# Patient Record
Sex: MALE | Race: WHITE | NOT HISPANIC OR LATINO | Employment: OTHER | URBAN - NONMETROPOLITAN AREA
[De-identification: names, ages, dates, MRNs, and addresses within clinical notes are randomized per-mention and may not be internally consistent; named-entity substitution may affect disease eponyms.]

---

## 2018-07-15 ENCOUNTER — HISTORICAL (OUTPATIENT)
Dept: ADMINISTRATIVE | Facility: HOSPITAL | Age: 63
End: 2018-07-15

## 2022-06-01 ENCOUNTER — ANESTHESIA EVENT (OUTPATIENT)
Dept: SURGERY | Facility: HOSPITAL | Age: 67
End: 2022-06-01
Payer: MEDICARE

## 2022-06-01 ENCOUNTER — HOSPITAL ENCOUNTER (OUTPATIENT)
Dept: PREADMISSION TESTING | Facility: HOSPITAL | Age: 67
Discharge: HOME | End: 2022-06-01
Attending: SURGERY
Payer: MEDICARE

## 2022-06-01 ENCOUNTER — LAB VISIT (OUTPATIENT)
Dept: LAB | Facility: HOSPITAL | Age: 67
End: 2022-06-01
Attending: SURGERY
Payer: MEDICARE

## 2022-06-01 DIAGNOSIS — K40.90 RIGHT INGUINAL HERNIA: Primary | ICD-10-CM

## 2022-06-01 LAB
AMORPH URATE CRY URNS QL MICRO: ABNORMAL /HPF
APPEARANCE UR: ABNORMAL
BACTERIA #/AREA URNS AUTO: ABNORMAL /HPF
BILIRUB UR QL STRIP.AUTO: NEGATIVE MG/DL
COLOR UR AUTO: YELLOW
GLUCOSE UR QL STRIP.AUTO: NEGATIVE MG/DL
KETONES UR QL STRIP.AUTO: NEGATIVE MG/DL
LEUKOCYTE ESTERASE UR QL STRIP.AUTO: NEGATIVE UNIT/L
NITRITE UR QL STRIP.AUTO: NEGATIVE
PH UR STRIP.AUTO: 6.5 [PH]
PROT UR QL STRIP.AUTO: NEGATIVE MG/DL
RBC #/AREA URNS AUTO: ABNORMAL /HPF
RBC UR QL AUTO: NEGATIVE UNIT/L
SARS-COV-2 RDRP RESP QL NAA+PROBE: NEGATIVE
SP GR UR STRIP.AUTO: 1.02
SQUAMOUS #/AREA URNS AUTO: ABNORMAL /LPF
UROBILINOGEN UR STRIP-ACNC: 0.2 MG/DL
WBC #/AREA URNS AUTO: ABNORMAL /HPF

## 2022-06-01 PROCEDURE — 81001 URINALYSIS AUTO W/SCOPE: CPT

## 2022-06-01 PROCEDURE — 81003 URINALYSIS AUTO W/O SCOPE: CPT

## 2022-06-01 PROCEDURE — 87635 SARS-COV-2 COVID-19 AMP PRB: CPT

## 2022-06-01 RX ORDER — TRAMADOL HYDROCHLORIDE 50 MG/1
50 TABLET ORAL
COMMUNITY
End: 2023-01-24

## 2022-06-01 RX ORDER — MELOXICAM 7.5 MG/1
7.5-15 TABLET ORAL DAILY PRN
COMMUNITY
Start: 2022-04-12 | End: 2023-01-11

## 2022-06-01 NOTE — PRE-PROCEDURE INSTRUCTIONS
All instructions given to patient regarding operative procedure. All questions answered. Will call 6/2/22 with arrival time for 6/3

## 2022-06-01 NOTE — DISCHARGE INSTRUCTIONS
No driving today    Do not get dressing wet    Remove dressing on Sunday 6/5/22    Shower only after you remove dressing - no baths    Leave steri-strips in place until your follow up appointment next week with Dr. Tolliver

## 2022-06-03 ENCOUNTER — HOSPITAL ENCOUNTER (OUTPATIENT)
Facility: HOSPITAL | Age: 67
Discharge: HOME | End: 2022-06-03
Attending: SURGERY | Admitting: SURGERY
Payer: MEDICARE

## 2022-06-03 ENCOUNTER — ANESTHESIA (OUTPATIENT)
Dept: SURGERY | Facility: HOSPITAL | Age: 67
End: 2022-06-03
Payer: MEDICARE

## 2022-06-03 DIAGNOSIS — K40.90 INGUINAL HERNIA, RIGHT: Primary | ICD-10-CM

## 2022-06-03 DIAGNOSIS — Z01.818 PRE-OP TESTING: ICD-10-CM

## 2022-06-03 LAB
ALBUMIN SERPL-MCNC: 4 GM/DL (ref 3.4–4.8)
ALBUMIN/GLOB SERPL: 1.4 RATIO (ref 1.1–2)
ALP SERPL-CCNC: 73 UNIT/L (ref 40–150)
ALT SERPL-CCNC: 19 UNIT/L (ref 0–55)
AST SERPL-CCNC: 16 UNIT/L (ref 5–34)
BASOPHILS # BLD AUTO: 0.04 X10(3)/MCL (ref 0–0.2)
BASOPHILS NFR BLD AUTO: 0.4 %
BILIRUBIN DIRECT+TOT PNL SERPL-MCNC: 0.4 MG/DL
BUN SERPL-MCNC: 13 MG/DL (ref 8.4–25.7)
CALCIUM SERPL-MCNC: 9.4 MG/DL (ref 8.8–10)
CHLORIDE SERPL-SCNC: 106 MMOL/L (ref 98–107)
CO2 SERPL-SCNC: 28 MMOL/L (ref 23–31)
CREAT SERPL-MCNC: 0.83 MG/DL (ref 0.73–1.18)
EOSINOPHIL # BLD AUTO: 0.81 X10(3)/MCL (ref 0–0.9)
EOSINOPHIL NFR BLD AUTO: 8.5 %
ERYTHROCYTE [DISTWIDTH] IN BLOOD BY AUTOMATED COUNT: 12.7 % (ref 11.5–17)
GLOBULIN SER-MCNC: 2.9 GM/DL (ref 2.4–3.5)
GLUCOSE SERPL-MCNC: 107 MG/DL (ref 82–115)
HCT VFR BLD AUTO: 46.5 % (ref 42–52)
HGB BLD-MCNC: 15.4 GM/DL (ref 14–18)
IMM GRANULOCYTES # BLD AUTO: 0.03 X10(3)/MCL (ref 0–0.02)
IMM GRANULOCYTES NFR BLD AUTO: 0.3 % (ref 0–0.43)
LYMPHOCYTES # BLD AUTO: 2.15 X10(3)/MCL (ref 0.6–4.6)
LYMPHOCYTES NFR BLD AUTO: 22.7 %
MCH RBC QN AUTO: 31.7 PG (ref 27–31)
MCHC RBC AUTO-ENTMCNC: 33.1 MG/DL (ref 33–36)
MCV RBC AUTO: 95.7 FL (ref 80–94)
MONOCYTES # BLD AUTO: 0.7 X10(3)/MCL (ref 0.1–1.3)
MONOCYTES NFR BLD AUTO: 7.4 %
NEUTROPHILS # BLD AUTO: 5.8 X10(3)/MCL (ref 2.1–9.2)
NEUTROPHILS NFR BLD AUTO: 60.7 %
NRBC BLD AUTO-RTO: 0 %
PLATELET # BLD AUTO: 210 X10(3)/MCL (ref 130–400)
PMV BLD AUTO: 10.1 FL (ref 9.4–12.4)
POTASSIUM SERPL-SCNC: 4.7 MMOL/L (ref 3.5–5.1)
PROT SERPL-MCNC: 6.9 GM/DL (ref 5.8–7.6)
RBC # BLD AUTO: 4.86 X10(6)/MCL (ref 4.7–6.1)
SODIUM SERPL-SCNC: 143 MMOL/L (ref 136–145)
WBC # SPEC AUTO: 9.5 X10(3)/MCL (ref 4.5–11.5)

## 2022-06-03 PROCEDURE — 71000039 HC RECOVERY, EACH ADD'L HOUR: Performed by: SURGERY

## 2022-06-03 PROCEDURE — 71000016 HC POSTOP RECOV ADDL HR: Performed by: SURGERY

## 2022-06-03 PROCEDURE — C1781 MESH (IMPLANTABLE): HCPCS | Performed by: SURGERY

## 2022-06-03 PROCEDURE — 64486 TAP BLOCK UNIL BY INJECTION: CPT | Mod: 59,RT,, | Performed by: NURSE ANESTHETIST, CERTIFIED REGISTERED

## 2022-06-03 PROCEDURE — 36000707: Performed by: SURGERY

## 2022-06-03 PROCEDURE — 85025 COMPLETE CBC W/AUTO DIFF WBC: CPT | Performed by: NURSE ANESTHETIST, CERTIFIED REGISTERED

## 2022-06-03 PROCEDURE — 93005 ELECTROCARDIOGRAM TRACING: CPT

## 2022-06-03 PROCEDURE — 27201423 OPTIME MED/SURG SUP & DEVICES STERILE SUPPLY: Performed by: SURGERY

## 2022-06-03 PROCEDURE — 36000706: Performed by: SURGERY

## 2022-06-03 PROCEDURE — C1729 CATH, DRAINAGE: HCPCS | Performed by: SURGERY

## 2022-06-03 PROCEDURE — 63600175 PHARM REV CODE 636 W HCPCS: Performed by: NURSE ANESTHETIST, CERTIFIED REGISTERED

## 2022-06-03 PROCEDURE — 80053 COMPREHEN METABOLIC PANEL: CPT | Performed by: NURSE ANESTHETIST, CERTIFIED REGISTERED

## 2022-06-03 PROCEDURE — 00830 ANES HERNIA RPR LWR ABD NOS: CPT | Mod: QZ,P2 | Performed by: NURSE ANESTHETIST, CERTIFIED REGISTERED

## 2022-06-03 PROCEDURE — D9220AH HC ANESTHESIA PROFESSIONAL FEE: Mod: QZ,P2 | Performed by: NURSE ANESTHETIST, CERTIFIED REGISTERED

## 2022-06-03 PROCEDURE — 71000033 HC RECOVERY, INTIAL HOUR: Performed by: SURGERY

## 2022-06-03 PROCEDURE — 36415 COLL VENOUS BLD VENIPUNCTURE: CPT | Performed by: NURSE ANESTHETIST, CERTIFIED REGISTERED

## 2022-06-03 PROCEDURE — 25000003 PHARM REV CODE 250: Performed by: NURSE ANESTHETIST, CERTIFIED REGISTERED

## 2022-06-03 PROCEDURE — 37000008 HC ANESTHESIA 1ST 15 MINUTES: Performed by: SURGERY

## 2022-06-03 PROCEDURE — 64486 PR TAP BLOCK UNILAT;BY INJECTION(S) INCL IMAG GUIDANCE: ICD-10-PCS | Mod: 59,RT,, | Performed by: NURSE ANESTHETIST, CERTIFIED REGISTERED

## 2022-06-03 PROCEDURE — 99900031 HC PATIENT EDUCATION (STAT)

## 2022-06-03 PROCEDURE — 37000009 HC ANESTHESIA EA ADD 15 MINS: Performed by: SURGERY

## 2022-06-03 PROCEDURE — 63600175 PHARM REV CODE 636 W HCPCS: Performed by: SURGERY

## 2022-06-03 PROCEDURE — 25000003 PHARM REV CODE 250: Performed by: SURGERY

## 2022-06-03 PROCEDURE — 71000015 HC POSTOP RECOV 1ST HR: Performed by: SURGERY

## 2022-06-03 PROCEDURE — 25000003 PHARM REV CODE 250

## 2022-06-03 DEVICE — MESH PROGRIP RIGHT: Type: IMPLANTABLE DEVICE | Site: GROIN | Status: FUNCTIONAL

## 2022-06-03 RX ORDER — MORPHINE SULFATE 10 MG/ML
3 INJECTION INTRAMUSCULAR; INTRAVENOUS; SUBCUTANEOUS
Status: DISCONTINUED | OUTPATIENT
Start: 2022-06-03 | End: 2022-06-03 | Stop reason: HOSPADM

## 2022-06-03 RX ORDER — ONDANSETRON 4 MG/1
8 TABLET, ORALLY DISINTEGRATING ORAL EVERY 8 HOURS PRN
Status: DISCONTINUED | OUTPATIENT
Start: 2022-06-03 | End: 2022-06-03 | Stop reason: HOSPADM

## 2022-06-03 RX ORDER — MIDAZOLAM HYDROCHLORIDE 1 MG/ML
INJECTION INTRAMUSCULAR; INTRAVENOUS
Status: DISCONTINUED | OUTPATIENT
Start: 2022-06-03 | End: 2022-06-03

## 2022-06-03 RX ORDER — HYDROCODONE BITARTRATE AND ACETAMINOPHEN 5; 325 MG/1; MG/1
1 TABLET ORAL EVERY 4 HOURS PRN
Status: DISCONTINUED | OUTPATIENT
Start: 2022-06-03 | End: 2022-06-03

## 2022-06-03 RX ORDER — DEXAMETHASONE SODIUM PHOSPHATE 4 MG/ML
INJECTION, SOLUTION INTRA-ARTICULAR; INTRALESIONAL; INTRAMUSCULAR; INTRAVENOUS; SOFT TISSUE
Status: DISCONTINUED | OUTPATIENT
Start: 2022-06-03 | End: 2022-06-03

## 2022-06-03 RX ORDER — HYDROCODONE BITARTRATE AND ACETAMINOPHEN 5; 325 MG/1; MG/1
1 TABLET ORAL EVERY 6 HOURS PRN
Qty: 25 TABLET | Refills: 0 | Status: SHIPPED | OUTPATIENT
Start: 2022-06-03 | End: 2023-09-12

## 2022-06-03 RX ORDER — PROPOFOL 10 MG/ML
VIAL (ML) INTRAVENOUS
Status: DISCONTINUED | OUTPATIENT
Start: 2022-06-03 | End: 2022-06-03

## 2022-06-03 RX ORDER — NEOSTIGMINE METHYLSULFATE 1 MG/ML
INJECTION, SOLUTION INTRAVENOUS
Status: DISCONTINUED | OUTPATIENT
Start: 2022-06-03 | End: 2022-06-03

## 2022-06-03 RX ORDER — BUPIVACAINE HYDROCHLORIDE 5 MG/ML
INJECTION, SOLUTION EPIDURAL; INTRACAUDAL
Status: COMPLETED | OUTPATIENT
Start: 2022-06-03 | End: 2022-06-03

## 2022-06-03 RX ORDER — HYDROMORPHONE HYDROCHLORIDE 1 MG/ML
0.5 INJECTION, SOLUTION INTRAMUSCULAR; INTRAVENOUS; SUBCUTANEOUS EVERY 5 MIN PRN
Status: DISCONTINUED | OUTPATIENT
Start: 2022-06-03 | End: 2022-06-03 | Stop reason: HOSPADM

## 2022-06-03 RX ORDER — LIDOCAINE HYDROCHLORIDE 10 MG/ML
INJECTION, SOLUTION INTRAVENOUS
Status: DISCONTINUED | OUTPATIENT
Start: 2022-06-03 | End: 2022-06-03

## 2022-06-03 RX ORDER — CEFAZOLIN SODIUM 1 G/3ML
2 INJECTION, POWDER, FOR SOLUTION INTRAMUSCULAR; INTRAVENOUS
Status: DISCONTINUED | OUTPATIENT
Start: 2022-06-03 | End: 2022-06-03 | Stop reason: HOSPADM

## 2022-06-03 RX ORDER — ATROPINE SULFATE 0.4 MG/ML
INJECTION, SOLUTION ENDOTRACHEAL; INTRAMEDULLARY; INTRAMUSCULAR; INTRAVENOUS; SUBCUTANEOUS
Status: DISCONTINUED | OUTPATIENT
Start: 2022-06-03 | End: 2022-06-03

## 2022-06-03 RX ORDER — ROCURONIUM BROMIDE 10 MG/ML
INJECTION, SOLUTION INTRAVENOUS
Status: DISCONTINUED | OUTPATIENT
Start: 2022-06-03 | End: 2022-06-03

## 2022-06-03 RX ORDER — SODIUM CHLORIDE, SODIUM LACTATE, POTASSIUM CHLORIDE, CALCIUM CHLORIDE 600; 310; 30; 20 MG/100ML; MG/100ML; MG/100ML; MG/100ML
INJECTION, SOLUTION INTRAVENOUS CONTINUOUS
Status: DISCONTINUED | OUTPATIENT
Start: 2022-06-03 | End: 2022-06-03 | Stop reason: HOSPADM

## 2022-06-03 RX ORDER — DROPERIDOL 2.5 MG/ML
INJECTION, SOLUTION INTRAMUSCULAR; INTRAVENOUS
Status: DISCONTINUED | OUTPATIENT
Start: 2022-06-03 | End: 2022-06-03

## 2022-06-03 RX ORDER — MEPERIDINE HYDROCHLORIDE 25 MG/ML
12.5 INJECTION INTRAMUSCULAR; INTRAVENOUS; SUBCUTANEOUS EVERY 10 MIN PRN
Status: DISCONTINUED | OUTPATIENT
Start: 2022-06-03 | End: 2022-06-03 | Stop reason: HOSPADM

## 2022-06-03 RX ORDER — SODIUM CHLORIDE 9 MG/ML
INJECTION, SOLUTION INTRAVENOUS CONTINUOUS
Status: DISCONTINUED | OUTPATIENT
Start: 2022-06-03 | End: 2022-06-03 | Stop reason: HOSPADM

## 2022-06-03 RX ORDER — FENTANYL CITRATE 50 UG/ML
INJECTION, SOLUTION INTRAMUSCULAR; INTRAVENOUS
Status: DISCONTINUED | OUTPATIENT
Start: 2022-06-03 | End: 2022-06-03

## 2022-06-03 RX ORDER — HYDROCODONE BITARTRATE AND ACETAMINOPHEN 5; 325 MG/1; MG/1
1 TABLET ORAL EVERY 6 HOURS PRN
Status: DISCONTINUED | OUTPATIENT
Start: 2022-06-03 | End: 2022-06-03 | Stop reason: HOSPADM

## 2022-06-03 RX ORDER — LIDOCAINE HYDROCHLORIDE AND EPINEPHRINE 10; 10 MG/ML; UG/ML
INJECTION, SOLUTION INFILTRATION; PERINEURAL
Status: DISCONTINUED | OUTPATIENT
Start: 2022-06-03 | End: 2022-06-03 | Stop reason: HOSPADM

## 2022-06-03 RX ADMIN — NEOSTIGMINE METHYLSULFATE 2.5 MG: 1 INJECTION INTRAVENOUS at 12:06

## 2022-06-03 RX ADMIN — HYDROMORPHONE HYDROCHLORIDE 0.5 MG: 1 INJECTION, SOLUTION INTRAMUSCULAR; INTRAVENOUS; SUBCUTANEOUS at 01:06

## 2022-06-03 RX ADMIN — BUPIVACAINE HYDROCHLORIDE 15 ML: 5 INJECTION, SOLUTION EPIDURAL; INTRACAUDAL; PERINEURAL at 11:06

## 2022-06-03 RX ADMIN — PROPOFOL 150 MG: 10 INJECTION, EMULSION INTRAVENOUS at 11:06

## 2022-06-03 RX ADMIN — SODIUM CHLORIDE, POTASSIUM CHLORIDE, SODIUM LACTATE AND CALCIUM CHLORIDE: 600; 310; 30; 20 INJECTION, SOLUTION INTRAVENOUS at 08:06

## 2022-06-03 RX ADMIN — MIDAZOLAM HYDROCHLORIDE 2 MG: 1 INJECTION, SOLUTION INTRAMUSCULAR; INTRAVENOUS at 11:06

## 2022-06-03 RX ADMIN — FENTANYL CITRATE 100 MCG: 50 INJECTION INTRAMUSCULAR; INTRAVENOUS at 11:06

## 2022-06-03 RX ADMIN — DROPERIDOL 0.62 MG: 2.5 INJECTION, SOLUTION INTRAMUSCULAR; INTRAVENOUS at 11:06

## 2022-06-03 RX ADMIN — ROCURONIUM BROMIDE 20 MG: 10 INJECTION, SOLUTION INTRAVENOUS at 11:06

## 2022-06-03 RX ADMIN — ATROPINE SULFATE 0.4 MG: 0.4 INJECTION, SOLUTION INTRAMUSCULAR; INTRAVENOUS; SUBCUTANEOUS at 12:06

## 2022-06-03 RX ADMIN — LIDOCAINE HYDROCHLORIDE 20 MG: 10 INJECTION, SOLUTION INTRAVENOUS at 11:06

## 2022-06-03 RX ADMIN — CEFAZOLIN 2 G: 1 INJECTION, POWDER, FOR SOLUTION INTRAMUSCULAR; INTRAVENOUS; PARENTERAL at 11:06

## 2022-06-03 NOTE — PLAN OF CARE
Ice pack applied to right inguinal area. Dressing to site continues to remain clean, dry and intact.

## 2022-06-03 NOTE — PLAN OF CARE
Pedro Luis Patel CRNA at pt bedside. Pulse 48. States pt stable enough to send back to post-op room. Notify Dr Uribe of pt bradycardia.

## 2022-06-03 NOTE — ANESTHESIA PROCEDURE NOTES
Peripheral Block    Patient location during procedure: OR   Block not for primary anesthetic.  Reason for block: at surgeon's request and post-op pain management   Post-op Pain Location: right Inguinal   Start time: 6/3/2022 11:35 AM  Timeout: 6/3/2022 11:35 AM   End time: 6/3/2022 11:40 AM    Staffing  Authorizing Provider: Romain Patel CRNA  Performing Provider: Romain Patel CRNA    Preanesthetic Checklist  Completed: patient identified, IV checked, site marked, risks and benefits discussed, surgical consent, monitors and equipment checked, pre-op evaluation and timeout performed  Peripheral Block  Patient position: supine  Prep: ChloraPrep  Patient monitoring: heart rate and continuous pulse ox  Block type: TAP  Laterality: right  Injection technique: single shot  Needle  Needle type: Stimuplex   Needle gauge: 20 G  Needle length: 4 in  Needle localization: ultrasound guidance  Needle insertion depth: 2.5 cm     Assessment  Injection assessment: negative aspiration and local visualized surrounding nerve  Heart rate change: no  Pain Tolerance: comfortable throughout block  Medications:    Medications: bupivacaine (pf) (MARCAINE) injection 0.5% - Perineural   15 mL - 6/3/2022 11:35:00 AM

## 2022-06-03 NOTE — TRANSFER OF CARE
"Anesthesia Transfer of Care Note    Patient: Sotero Medrano    Procedure(s) Performed: Procedure(s) (LRB):  REPAIR, HERNIA, INGUINAL (Right)    Patient location: PACU    Transport from OR: Transported from OR on room air with adequate spontaneous ventilation    Post pain: adequate analgesia    Post assessment: no apparent anesthetic complications    Post vital signs: stable    Level of consciousness: awake    Nausea/Vomiting: no nausea/vomiting    Complications: none    Transfer of care protocol was followed      Last vitals:   Visit Vitals  BP (!) 180/87   Pulse (!) 55   Temp 36.3 °C (97.4 °F) (Oral)   Resp 20   Ht 5' 6" (1.676 m)   Wt 65.3 kg (144 lb)   SpO2 98%   BMI 23.24 kg/m²     "

## 2022-06-03 NOTE — ANESTHESIA PROCEDURE NOTES
Intubation    Date/Time: 6/3/2022 11:24 AM  Performed by: Romain Patel CRNA  Authorized by: Romain Patel CRNA     Intubation:     Induction:  Intravenous    Mask Ventilation:  Easy mask    Attempts:  1    Attempted By:  CRNA    Method of Intubation:  Blind intubation    Laryngeal View Grade: Laryngeal Manipulation      Laryngeal View Grade (2nd Attempt): Laryngeal Manipulation      Laryngeal View Grade (3rd Attempt): Laryngeal Manipulation      Difficult Airway Encountered?: No      Complications:  None    Airway Device:  Supraglottic airway/LMA    Airway Device Size:  5.0    Style/Cuff Inflation:  Cuffed (inflated to minimal occlusive pressure)    Placement Verified By:  Capnometry    Complicating Factors:  None    Findings Post-Intubation:  BS equal bilateral

## 2022-06-03 NOTE — OP NOTE
Date of procedure:  06/03/2022    Indications:  59-year-old white male presenting with complaint of right inguinal enlarging bulge consistent with a right inguinal hernia elected to undergo inguinal hernia repair mesh.    Preoperative diagnosis:  Right inguinal hernia  Postoperative diagnosis:  Indirect right inguinal hernia    Specimen:  Indirect inguinal hernia sac  Estimated blood loss:  5 cc    Procedure performed:  Right inguinal hernia repair mesh with high ligation indirect inguinal hernia sac    Procedure in detail:  Patient brought to the operative theater laid in a supine position and general endotracheal intubation anesthesia was provided.  Preoperative antibiotics administered.  There of the groin was then trimmed of all hair sterilely prepped and draped in normal surgical fashion using chlorhexidine.  1% lidocaine with epinephrine infiltrate the subcutaneous tissues overlying the right inguinal crease.  Fifteen blade was incise the skin with dissection down to the fatty tissues.  Bovie cauterization carried down to the dissection to the external oblique fascia.  Upon identification it was opened lateral to medial opening up the external ring.  Cord structures identified they were encircled with a Penrose drain.  The vas deferens and the testicular vessels were then isolated free from indirect inguinal hernia sac.  Hernia sac was then high ligated with 2-0 Vicryl.  It was transected released back into the abdomen.  A segment of mesh was then encircled around the cord structures suturing it medially to the pubic tubercle run inferiorly along the inguinal ligament and superiorly to the conjoint tendon.  It was encircled around the cord structures recreating the deep inguinal ring with enough separation for the tip of my pinky finger.  Cavity was made hemostatic with Bovie cauterization the external oblique fascia was reapproximated in a lateral to medial direction recreating the external ring.  Skin and  subcutaneous tissues were then reapproximated a multilayer fashion using 3-0 Vicryl running 4-0 subcuticular Monocryl.  Sterile dressing was then placed upon the wound.  The patient was then relieved of anesthesia stable condition and transferred to postanesthesia care unit.    Complications:  None    Disposition:  Upon recovery from anesthesia patient will be discharged to home with a follow-up in outpatient clinic.    Lia Tolliver MD

## 2022-06-03 NOTE — ANESTHESIA POSTPROCEDURE EVALUATION
Anesthesia Post Evaluation    Patient: Sotero Medrano    Procedure(s) Performed: Procedure(s) (LRB):  REPAIR, HERNIA, INGUINAL (Right)    Final Anesthesia Type: general      Patient participation: Yes- Able to Participate  Level of consciousness: awake and alert  Post-procedure vital signs: reviewed and stable  Pain management: adequate  Airway patency: patent    PONV status at discharge: No PONV  Anesthetic complications: no      Cardiovascular status: blood pressure returned to baseline  Respiratory status: unassisted  Hydration status: euvolemic  Follow-up not needed.          Vitals Value Taken Time   /81 06/03/22 1253   Temp 36.4 06/03/22 1253   Pulse 92 06/03/22 1253   Resp 18 06/03/22 1253   SpO2 100 06/03/22 1253         No case tracking events are documented in the log.      Pain/Jaiden Score: No data recorded

## 2022-06-03 NOTE — ANESTHESIA PREPROCEDURE EVALUATION
06/03/2022  Sotero Medrano is a 67 y.o., male.      Pre-op Assessment    I have reviewed the Patient Summary Reports.     I have reviewed the Nursing Notes. I have reviewed the NPO Status.   I have reviewed the Medications.     Review of Systems  Anesthesia Hx:  No problems with previous Anesthesia  Denies Family Hx of Anesthesia complications.   Denies Personal Hx of Anesthesia complications.   Social:  Smoker    Hematology/Oncology:  Hematology Normal   Oncology Normal     EENT/Dental:EENT/Dental Normal   Cardiovascular:   Dysrhythmias SB w/ BBB  See EKG   Pulmonary:  Pulmonary Normal    Renal/:  Renal/ Normal     Hepatic/GI:  Hepatic/GI Normal    Musculoskeletal:  Musculoskeletal Normal    Neurological:  Neurology Normal    Endocrine:  Endocrine Normal    Psych:  Psychiatric Normal           Physical Exam  General: Well nourished, Cooperative, Alert and Oriented    Airway:  Mallampati: I   Mouth Opening: Normal  TM Distance: Normal  Tongue: Normal  Neck ROM: Normal ROM    Dental:  Intact    Chest/Lungs:  Normal Respiratory Rate    Heart:  Rate: Normal    Musculoskeletal:Normal mobility      Anesthesia Plan  Type of Anesthesia, risks & benefits discussed:    Anesthesia Type: Gen Supraglottic Airway  Intra-op Monitoring Plan: Standard ASA Monitors  Post Op Pain Control Plan: multimodal analgesia and peripheral nerve block  Induction:  IV  Informed Consent: Informed consent signed with the Patient and all parties understand the risks and agree with anesthesia plan.  All questions answered. Patient consented to blood products? Yes  ASA Score: 2  Day of Surgery Review of History & Physical: H&P Update referred to the surgeon/provider.  Anesthesia Plan Notes: Anesthesia plan was discussed with patient and/or representative. Risks and alternatives were discussed including the possibility of alteration of plan.      Ready For Surgery From Anesthesia Perspective.     .

## 2022-06-08 VITALS
OXYGEN SATURATION: 96 % | HEART RATE: 56 BPM | SYSTOLIC BLOOD PRESSURE: 197 MMHG | DIASTOLIC BLOOD PRESSURE: 89 MMHG | RESPIRATION RATE: 16 BRPM | TEMPERATURE: 98 F | HEIGHT: 66 IN | BODY MASS INDEX: 23.14 KG/M2 | WEIGHT: 144 LBS

## 2022-06-08 LAB
DHEA SERPL-MCNC: NORMAL
ESTROGEN SERPL-MCNC: NORMAL PG/ML
INSULIN SERPL-ACNC: NORMAL U[IU]/ML
LAB AP CLINICAL INFORMATION: NORMAL
LAB AP GROSS DESCRIPTION: NORMAL
LAB AP REPORT FOOTNOTES: NORMAL
T3RU NFR SERPL: NORMAL %

## 2022-07-18 ENCOUNTER — OFFICE VISIT (OUTPATIENT)
Dept: FAMILY MEDICINE | Facility: CLINIC | Age: 67
End: 2022-07-18
Payer: MEDICARE

## 2022-07-18 VITALS
BODY MASS INDEX: 22.5 KG/M2 | SYSTOLIC BLOOD PRESSURE: 133 MMHG | HEIGHT: 66 IN | DIASTOLIC BLOOD PRESSURE: 66 MMHG | TEMPERATURE: 98 F | WEIGHT: 140 LBS | RESPIRATION RATE: 20 BRPM | HEART RATE: 64 BPM

## 2022-07-18 DIAGNOSIS — K40.90 INGUINAL HERNIA, RIGHT: Primary | ICD-10-CM

## 2022-07-18 DIAGNOSIS — R11.0 NAUSEA: ICD-10-CM

## 2022-07-18 DIAGNOSIS — R19.7 DIARRHEA, UNSPECIFIED TYPE: ICD-10-CM

## 2022-07-18 DIAGNOSIS — K29.70 VIRAL GASTRITIS: ICD-10-CM

## 2022-07-18 LAB
CTP QC/QA: YES
SARS-COV-2 AG RESP QL IA.RAPID: NEGATIVE

## 2022-07-18 PROCEDURE — 87811 SARS-COV-2 COVID19 W/OPTIC: CPT | Mod: QW,RHCUB | Performed by: NURSE PRACTITIONER

## 2022-07-18 PROCEDURE — 99203 OFFICE O/P NEW LOW 30 MIN: CPT | Mod: ,,, | Performed by: NURSE PRACTITIONER

## 2022-07-18 PROCEDURE — 99203 PR OFFICE/OUTPT VISIT, NEW, LEVL III, 30-44 MIN: ICD-10-PCS | Mod: ,,, | Performed by: NURSE PRACTITIONER

## 2022-07-18 RX ORDER — ONDANSETRON 4 MG/1
4 TABLET, ORALLY DISINTEGRATING ORAL ONCE
Qty: 1 TABLET | Refills: 0 | Status: SHIPPED | OUTPATIENT
Start: 2022-07-18 | End: 2022-07-18

## 2022-07-18 NOTE — PROGRESS NOTES
Subjective:       Patient ID: Sotero Medrano is a 67 y.o. male.    Chief Complaint: Diarrhea (Diarrhea X's 4 days)    The patient is a 65-year-old male who presents stating that he had diarrhea on Friday it got better over the weekend he has diarrhea again today.  Some of the diarrhea has been accompanied abdominal cramping.  Patient states he has no pain at the present time.   Relief measures at home have been Imodium without relief.    He also states that he has bouts of nausea.  He states he is not very hungry due to the bouts of nausea.  Relief measures for nausea have been none.    COVID-19 swab-negative    Review of Systems   All other systems reviewed and are negative.        Objective:      Physical Exam  Vitals and nursing note reviewed.   HENT:      Head: Normocephalic.      Nose: Nose normal.   Eyes:      Extraocular Movements: Extraocular movements intact.   Cardiovascular:      Rate and Rhythm: Normal rate and regular rhythm.   Pulmonary:      Effort: Pulmonary effort is normal.      Breath sounds: Normal breath sounds.   Abdominal:      General: Bowel sounds are normal.      Palpations: Abdomen is soft.   Musculoskeletal:         General: Normal range of motion.      Cervical back: Normal range of motion.   Skin:     General: Skin is warm and dry.   Neurological:      Mental Status: He is alert and oriented to person, place, and time.         Assessment:       Problem List Items Addressed This Visit        GI    Nausea    Relevant Medications    ondansetron (ZOFRAN-ODT) 4 MG TbDL    Other Relevant Orders    SARS Coronavirus 2 Antigen, POCT Manual Read (Completed)    Diarrhea    Relevant Orders    SARS Coronavirus 2 Antigen, POCT Manual Read (Completed)    Viral gastritis          Plan:     viral gastritis:  Patient instructed to eat a bland diet.  I went over the BRAT diet with the patient (bananas, rice, applesauce and toast).     Patient instructed to return to the office if he is not better by the end  of this week.  It is

## 2022-07-20 ENCOUNTER — OFFICE VISIT (OUTPATIENT)
Dept: FAMILY MEDICINE | Facility: CLINIC | Age: 67
End: 2022-07-20
Payer: MEDICARE

## 2022-07-20 VITALS
WEIGHT: 140 LBS | HEIGHT: 66 IN | TEMPERATURE: 97 F | HEART RATE: 84 BPM | SYSTOLIC BLOOD PRESSURE: 117 MMHG | BODY MASS INDEX: 22.5 KG/M2 | RESPIRATION RATE: 20 BRPM | DIASTOLIC BLOOD PRESSURE: 73 MMHG

## 2022-07-20 DIAGNOSIS — H44.002 EYE INFECTION, LEFT: ICD-10-CM

## 2022-07-20 DIAGNOSIS — H18.892 CORNEAL IRRITATION OF LEFT EYE: ICD-10-CM

## 2022-07-20 PROCEDURE — 99213 OFFICE O/P EST LOW 20 MIN: CPT | Mod: ,,, | Performed by: NURSE PRACTITIONER

## 2022-07-20 PROCEDURE — 99213 PR OFFICE/OUTPT VISIT, EST, LEVL III, 20-29 MIN: ICD-10-PCS | Mod: ,,, | Performed by: NURSE PRACTITIONER

## 2022-07-20 RX ORDER — TOBRAMYCIN AND DEXAMETHASONE 3; 1 MG/ML; MG/ML
1 SUSPENSION/ DROPS OPHTHALMIC
Qty: 10 ML | Refills: 0 | Status: SHIPPED | OUTPATIENT
Start: 2022-07-20 | End: 2022-07-30

## 2023-01-11 ENCOUNTER — OFFICE VISIT (OUTPATIENT)
Dept: FAMILY MEDICINE | Facility: CLINIC | Age: 68
End: 2023-01-11
Payer: MEDICARE

## 2023-01-11 VITALS
SYSTOLIC BLOOD PRESSURE: 176 MMHG | DIASTOLIC BLOOD PRESSURE: 93 MMHG | HEIGHT: 66 IN | BODY MASS INDEX: 23.63 KG/M2 | HEART RATE: 65 BPM | WEIGHT: 147 LBS

## 2023-01-11 DIAGNOSIS — M54.50 CHRONIC MIDLINE LOW BACK PAIN WITHOUT SCIATICA: ICD-10-CM

## 2023-01-11 DIAGNOSIS — M19.90 ARTHRITIS: ICD-10-CM

## 2023-01-11 DIAGNOSIS — G89.29 CHRONIC MIDLINE LOW BACK PAIN WITHOUT SCIATICA: ICD-10-CM

## 2023-01-11 PROCEDURE — 99213 OFFICE O/P EST LOW 20 MIN: CPT | Mod: ,,, | Performed by: NURSE PRACTITIONER

## 2023-01-11 PROCEDURE — 99213 PR OFFICE/OUTPT VISIT, EST, LEVL III, 20-29 MIN: ICD-10-PCS | Mod: ,,, | Performed by: NURSE PRACTITIONER

## 2023-01-11 RX ORDER — METHYLPREDNISOLONE 4 MG/1
TABLET ORAL
Qty: 21 EACH | Refills: 0 | Status: SHIPPED | OUTPATIENT
Start: 2023-01-11 | End: 2023-02-01

## 2023-01-11 RX ORDER — MELOXICAM 15 MG/1
15 TABLET ORAL DAILY
Qty: 30 TABLET | Refills: 0 | Status: SHIPPED | OUTPATIENT
Start: 2023-01-11 | End: 2023-03-02 | Stop reason: SDUPTHER

## 2023-01-11 NOTE — PROGRESS NOTES
Subjective:       Patient ID: Sotero Medrano is a 67 y.o. male.    Chief Complaint: Back Pain (Chronic back pain that recently flared up. )      The patient is a 67-year-old male the room who presents with low back pain.  He states he has known arthritis.  He states it hurts all the way across is back.  He denies that it radiates down either leg.  Relief measures at home have been Meloxicam 7.5, icy hot and wearing a brace.    Back Pain  This is a recurrent problem. The current episode started 1 to 4 weeks ago. The problem occurs constantly. The problem has been gradually worsening since onset. The pain is present in the lumbar spine. The quality of the pain is described as aching and shooting. The pain does not radiate. The pain is at a severity of 9/10. The pain is severe. The pain is Worse during the day. The symptoms are aggravated by bending, coughing, position, standing, twisting, lying down and stress. Stiffness is present At night. He has tried NSAIDs (icy hot patch) for the symptoms. The treatment provided moderate relief.   Review of Systems   Musculoskeletal:  Positive for back pain.        See HPI   All other systems reviewed and are negative.      Objective:      Physical Exam  Vitals and nursing note reviewed.   HENT:      Head: Normocephalic.      Nose: Nose normal.      Mouth/Throat:      Mouth: Mucous membranes are moist.   Eyes:      Extraocular Movements: Extraocular movements intact.   Cardiovascular:      Rate and Rhythm: Normal rate.      Pulses: Normal pulses.      Heart sounds: No murmur heard.  Pulmonary:      Effort: Pulmonary effort is normal.      Breath sounds: Normal breath sounds.   Abdominal:      General: Bowel sounds are normal.      Palpations: Abdomen is soft.   Musculoskeletal:         General: Normal range of motion.      Cervical back: Normal range of motion and neck supple.   Skin:     General: Skin is warm and dry.   Neurological:      Mental Status: He is alert and oriented to  person, place, and time.       Assessment:       Problem List Items Addressed This Visit          Orthopedic    Chronic midline low back pain without sciatica    Relevant Medications    methylPREDNISolone (MEDROL DOSEPACK) 4 mg tablet    meloxicam (MOBIC) 15 MG tablet    Arthritis    Relevant Medications    methylPREDNISolone (MEDROL DOSEPACK) 4 mg tablet    meloxicam (MOBIC) 15 MG tablet       Plan:     Chronic midline low back pain without sciatica/arthritis:  Continue current exercise program of stretching and gentle movement such as walking  Take prescription medications as ordered  Patient declined to have x-ray  Call the office with any questions or concerns

## 2023-01-18 ENCOUNTER — HOSPITAL ENCOUNTER (OUTPATIENT)
Dept: RADIOLOGY | Facility: HOSPITAL | Age: 68
Discharge: HOME | End: 2023-01-18
Attending: NURSE PRACTITIONER
Payer: MEDICARE

## 2023-01-18 ENCOUNTER — OFFICE VISIT (OUTPATIENT)
Dept: FAMILY MEDICINE | Facility: CLINIC | Age: 68
End: 2023-01-18
Payer: MEDICARE

## 2023-01-18 VITALS
HEART RATE: 67 BPM | DIASTOLIC BLOOD PRESSURE: 65 MMHG | WEIGHT: 147 LBS | SYSTOLIC BLOOD PRESSURE: 131 MMHG | HEIGHT: 66 IN | RESPIRATION RATE: 20 BRPM | TEMPERATURE: 97 F | BODY MASS INDEX: 23.63 KG/M2

## 2023-01-18 DIAGNOSIS — G89.29 CHRONIC MIDLINE LOW BACK PAIN WITHOUT SCIATICA: Primary | ICD-10-CM

## 2023-01-18 DIAGNOSIS — M54.50 CHRONIC MIDLINE LOW BACK PAIN WITHOUT SCIATICA: ICD-10-CM

## 2023-01-18 DIAGNOSIS — G89.29 CHRONIC MIDLINE LOW BACK PAIN WITHOUT SCIATICA: ICD-10-CM

## 2023-01-18 DIAGNOSIS — M54.50 CHRONIC MIDLINE LOW BACK PAIN WITHOUT SCIATICA: Primary | ICD-10-CM

## 2023-01-18 PROCEDURE — 99212 PR OFFICE/OUTPT VISIT, EST, LEVL II, 10-19 MIN: ICD-10-PCS | Mod: ,,, | Performed by: NURSE PRACTITIONER

## 2023-01-18 PROCEDURE — 72110 X-RAY EXAM L-2 SPINE 4/>VWS: CPT | Mod: TC

## 2023-01-18 PROCEDURE — 99212 OFFICE O/P EST SF 10 MIN: CPT | Mod: ,,, | Performed by: NURSE PRACTITIONER

## 2023-01-18 NOTE — PROGRESS NOTES
Subjective:       Patient ID: Sotero Medrano is a 67 y.o. male.    Chief Complaint: Back Pain (1 week follow up low back pain)      The patient is a 67-Year-Old Male who presents with low back pain.  The pain is chronic.  I saw this patient a week ago.  I gave him a Medrol Dosepak in lieu of a Depo-Medrol injection.  The patient states that the steroids helped at 1st but it he took his last 1 yesterday and drove a couple 100 miles and ended up with excruciating pain.  He started taking Meloxicam today    Back Pain  This is a chronic problem. The current episode started more than 1 month ago. The problem occurs constantly. The problem has been waxing and waning since onset. The pain is present in the lumbar spine. The quality of the pain is described as aching, burning, cramping, shooting and stabbing. The pain does not radiate. The pain is at a severity of 9/10. The pain is severe. The pain is The same all the time. The symptoms are aggravated by bending, lying down, sitting, coughing, position, standing and twisting. Stiffness is present All day. Risk factors include lack of exercise, poor posture and sedentary lifestyle. He has tried NSAIDs (icy hot patches and brace) for the symptoms. The treatment provided mild relief.   Review of Systems   Musculoskeletal:  Positive for back pain and myalgias.        See HPI   All other systems reviewed and are negative.      Objective:      Physical Exam  HENT:      Head: Normocephalic.      Nose: Nose normal.      Mouth/Throat:      Mouth: Mucous membranes are moist.   Eyes:      Extraocular Movements: Extraocular movements intact.   Abdominal:      General: Bowel sounds are normal.      Palpations: Abdomen is soft.   Musculoskeletal:      Comments: Limited range of motion low back on extension, flexion and lateral   Skin:     General: Skin is warm and dry.   Neurological:      Mental Status: He is alert and oriented to person, place, and time.       Assessment:       Problem  List Items Addressed This Visit          Orthopedic    Chronic midline low back pain without sciatica - Primary    Relevant Orders    X-Ray Lumbar Spine 5 View (Completed)       Plan:         Chronic mid low back pain:  Continue prescription NSAID  X-ray lumbar spine  Perform back exercises  Will get an MRI in the future if no diagnostics x-ray    Call the office with any questions or concerns

## 2023-01-20 ENCOUNTER — TELEPHONE (OUTPATIENT)
Dept: FAMILY MEDICINE | Facility: CLINIC | Age: 68
End: 2023-01-20
Payer: MEDICARE

## 2023-01-20 DIAGNOSIS — M54.9 DORSALGIA, UNSPECIFIED: Primary | ICD-10-CM

## 2023-01-24 ENCOUNTER — OFFICE VISIT (OUTPATIENT)
Dept: FAMILY MEDICINE | Facility: CLINIC | Age: 68
End: 2023-01-24
Payer: MEDICARE

## 2023-01-24 VITALS
HEIGHT: 66 IN | DIASTOLIC BLOOD PRESSURE: 72 MMHG | BODY MASS INDEX: 22.66 KG/M2 | TEMPERATURE: 97 F | HEART RATE: 58 BPM | WEIGHT: 141 LBS | RESPIRATION RATE: 20 BRPM | SYSTOLIC BLOOD PRESSURE: 134 MMHG

## 2023-01-24 DIAGNOSIS — H65.04 RECURRENT ACUTE SEROUS OTITIS MEDIA OF RIGHT EAR: ICD-10-CM

## 2023-01-24 DIAGNOSIS — R05.9 COUGH, UNSPECIFIED TYPE: ICD-10-CM

## 2023-01-24 DIAGNOSIS — Z72.0 TOBACCO ABUSE: ICD-10-CM

## 2023-01-24 DIAGNOSIS — J01.11 ACUTE RECURRENT FRONTAL SINUSITIS: ICD-10-CM

## 2023-01-24 DIAGNOSIS — J02.9 THROAT INFECTION: ICD-10-CM

## 2023-01-24 LAB
CTP QC/QA: YES
CTP QC/QA: YES
FLUAV AG NPH QL: NEGATIVE
FLUBV AG NPH QL: NEGATIVE
S PYO RRNA THROAT QL PROBE: NEGATIVE

## 2023-01-24 PROCEDURE — 99213 PR OFFICE/OUTPT VISIT, EST, LEVL III, 20-29 MIN: ICD-10-PCS | Mod: ,,, | Performed by: NURSE PRACTITIONER

## 2023-01-24 PROCEDURE — 87502 INFLUENZA DNA AMP PROBE: CPT | Mod: QW,RHCUB | Performed by: NURSE PRACTITIONER

## 2023-01-24 PROCEDURE — 87880 STREP A ASSAY W/OPTIC: CPT | Mod: QW,RHCUB | Performed by: NURSE PRACTITIONER

## 2023-01-24 PROCEDURE — 99213 OFFICE O/P EST LOW 20 MIN: CPT | Mod: ,,, | Performed by: NURSE PRACTITIONER

## 2023-01-24 RX ORDER — PROMETHAZINE HYDROCHLORIDE AND CODEINE PHOSPHATE 6.25; 1 MG/5ML; MG/5ML
5 SOLUTION ORAL EVERY 6 HOURS PRN
Qty: 150 ML | Refills: 0 | Status: SHIPPED | OUTPATIENT
Start: 2023-01-24 | End: 2023-01-31

## 2023-01-24 RX ORDER — MONTELUKAST SODIUM 10 MG/1
10 TABLET ORAL NIGHTLY
Qty: 30 TABLET | Refills: 0 | Status: SHIPPED | OUTPATIENT
Start: 2023-01-24 | End: 2023-02-23

## 2023-01-24 RX ORDER — NEOMYCIN SULFATE, POLYMYXIN B SULFATE AND HYDROCORTISONE 10; 3.5; 1 MG/ML; MG/ML; [USP'U]/ML
3 SUSPENSION/ DROPS AURICULAR (OTIC) 3 TIMES DAILY
Qty: 5 ML | Refills: 0 | Status: SHIPPED | OUTPATIENT
Start: 2023-01-24 | End: 2023-01-31

## 2023-01-24 RX ORDER — AMOXICILLIN 500 MG/1
500 TABLET, FILM COATED ORAL EVERY 12 HOURS
Qty: 14 TABLET | Refills: 0 | Status: SHIPPED | OUTPATIENT
Start: 2023-01-24 | End: 2023-01-31

## 2023-01-24 NOTE — PROGRESS NOTES
Subjective:       Patient ID: Sotero Medrano is a 67 y.o. male.    Chief Complaint: Sinus Problem (Sinus problems, cough, bilateral earaches X's 1 week)      The patient is a 67-year-old male who presents with the following symptoms:  Headache, sinus congestion, cough, sore throat, and bilateral earache.  Relief measures at home have been NyQuil with mild relief.    Review of Systems   Constitutional:  Positive for fatigue.   HENT:  Positive for nasal congestion, ear pain, postnasal drip, rhinorrhea, sinus pressure/congestion, sneezing, sore throat and trouble swallowing.    Respiratory:  Positive for cough.    All other systems reviewed and are negative.      Objective:      Physical Exam  Nursing note reviewed.   HENT:      Head: Normocephalic.      Left Ear: Tympanic membrane normal.      Ears:      Comments: Right TM bulging with effusion and bright red define erythema erythema     Nose: Congestion and rhinorrhea present.      Mouth/Throat:      Pharynx: Oropharyngeal exudate and posterior oropharyngeal erythema present.      Comments: Pus pocket visible posterior right pharynx  Eyes:      Extraocular Movements: Extraocular movements intact.   Cardiovascular:      Rate and Rhythm: Normal rate and regular rhythm.      Heart sounds: No murmur heard.  Pulmonary:      Effort: Pulmonary effort is normal.      Breath sounds: Normal breath sounds.   Abdominal:      General: Bowel sounds are normal.      Palpations: Abdomen is soft.   Musculoskeletal:         General: Normal range of motion.      Cervical back: Normal range of motion and neck supple.   Skin:     General: Skin is warm and dry.   Neurological:      Mental Status: He is alert and oriented to person, place, and time.       Assessment:       Problem List Items Addressed This Visit          ENT    Recurrent acute serous otitis media of right ear    Relevant Medications    amoxicillin (AMOXIL) 500 MG Tab    Acute recurrent frontal sinusitis    Relevant  Medications    amoxicillin (AMOXIL) 500 MG Tab    montelukast (SINGULAIR) 10 mg tablet    Throat infection    Relevant Medications    amoxicillin (AMOXIL) 500 MG Tab    Other Relevant Orders    POCT Rapid Strep A (Completed)       Pulmonary    Cough    Relevant Medications    promethazine-codeine 6.25-10 mg/5 ml (PHENERGAN WITH CODEINE) 6.25-10 mg/5 mL syrup    Other Relevant Orders    POCT Influenza A/B (Completed)       Other    Tobacco abuse         Plan:         Acute otitis media right ear:  Use prescription eardrops as ordered  Take prescription antibiotics as ordered    Recurrent frontal sinusitis:  Take prescription antibiotics as ordered    Throat infection with pus pocket:  Take prescription antibiotics as ordered    Cough:   use prescription cough medicine as ordered    Tobacco user  Adverse affects of tobacco discussed; 5 minutes spent counseling  Patient encouraged to remain abstinent from tobacco products  Return to clinic with any concerns    Call the office with any questions or concerns

## 2023-01-30 ENCOUNTER — HOSPITAL ENCOUNTER (OUTPATIENT)
Dept: RADIOLOGY | Facility: HOSPITAL | Age: 68
Discharge: HOME | End: 2023-01-30
Attending: NURSE PRACTITIONER
Payer: MEDICARE

## 2023-01-30 DIAGNOSIS — M54.9 DORSALGIA, UNSPECIFIED: ICD-10-CM

## 2023-01-30 PROCEDURE — 72148 MRI LUMBAR SPINE W/O DYE: CPT | Mod: TC

## 2023-02-02 ENCOUNTER — TELEPHONE (OUTPATIENT)
Dept: FAMILY MEDICINE | Facility: CLINIC | Age: 68
End: 2023-02-02
Payer: MEDICARE

## 2023-02-02 NOTE — TELEPHONE ENCOUNTER
I called the patient and spoke with him about his MRI report.  The MRI report does shows some bulges in the discs and some compression along with lumbar degenerative disc disease and spondylosis.  I told the patient at this point the best thing to do would be physical therapy and nonsteroidal anti-inflammatories.

## 2023-03-02 DIAGNOSIS — G89.29 CHRONIC MIDLINE LOW BACK PAIN WITHOUT SCIATICA: ICD-10-CM

## 2023-03-02 DIAGNOSIS — M54.50 CHRONIC MIDLINE LOW BACK PAIN WITHOUT SCIATICA: ICD-10-CM

## 2023-03-02 DIAGNOSIS — M19.90 ARTHRITIS: ICD-10-CM

## 2023-03-02 RX ORDER — MONTELUKAST SODIUM 10 MG/1
10 TABLET ORAL NIGHTLY
Qty: 30 TABLET | Refills: 5 | Status: SHIPPED | OUTPATIENT
Start: 2023-03-02 | End: 2023-09-12

## 2023-03-02 RX ORDER — MELOXICAM 15 MG/1
15 TABLET ORAL DAILY
Qty: 30 TABLET | Refills: 0 | Status: SHIPPED | OUTPATIENT
Start: 2023-03-02 | End: 2023-09-12

## 2023-03-02 RX ORDER — MONTELUKAST SODIUM 10 MG/1
10 TABLET ORAL NIGHTLY
COMMUNITY
End: 2023-03-02 | Stop reason: SDUPTHER

## 2023-03-28 ENCOUNTER — PATIENT OUTREACH (OUTPATIENT)
Dept: ADMINISTRATIVE | Facility: HOSPITAL | Age: 68
End: 2023-03-28
Payer: MEDICARE

## 2023-03-28 NOTE — PROGRESS NOTES
[facility] call. Called patient regarding Colorectal CA Screening to see when and where he/she would have had it done. Left message to return call.    If you have any questions please give me a call.  Chelsea Whaley MA-Panel Care Coordinator at [phone].

## 2023-04-25 ENCOUNTER — OFFICE VISIT (OUTPATIENT)
Dept: FAMILY MEDICINE | Facility: CLINIC | Age: 68
End: 2023-04-25
Payer: MEDICARE

## 2023-04-25 VITALS
BODY MASS INDEX: 22.98 KG/M2 | HEIGHT: 66 IN | SYSTOLIC BLOOD PRESSURE: 152 MMHG | HEART RATE: 67 BPM | DIASTOLIC BLOOD PRESSURE: 68 MMHG | TEMPERATURE: 97 F | RESPIRATION RATE: 20 BRPM | WEIGHT: 143 LBS

## 2023-04-25 DIAGNOSIS — Z71.1 CONCERN ABOUT STD IN MALE WITHOUT DIAGNOSIS: ICD-10-CM

## 2023-04-25 DIAGNOSIS — R30.0 DYSURIA: Primary | ICD-10-CM

## 2023-04-25 DIAGNOSIS — R31.9 HEMATURIA, UNSPECIFIED TYPE: ICD-10-CM

## 2023-04-25 DIAGNOSIS — Z12.5 SCREENING FOR MALIGNANT NEOPLASM OF PROSTATE: ICD-10-CM

## 2023-04-25 LAB
BILIRUB SERPL-MCNC: NORMAL MG/DL
BLOOD URINE, POC: NORMAL
CLARITY, POC UA: CLEAR
COLOR, POC UA: YELLOW
GLUCOSE UR QL STRIP: NORMAL
KETONES UR QL STRIP: NORMAL
LEUKOCYTE ESTERASE URINE, POC: NORMAL
NITRITE, POC UA: NORMAL
PH, POC UA: 6.5
PROTEIN, POC: NORMAL
SPECIFIC GRAVITY, POC UA: 1.02
UROBILINOGEN, POC UA: 0.2

## 2023-04-25 PROCEDURE — 99214 OFFICE O/P EST MOD 30 MIN: CPT | Mod: ,,,

## 2023-04-25 PROCEDURE — 81002 URINALYSIS NONAUTO W/O SCOPE: CPT | Mod: RHCUB,59

## 2023-04-25 PROCEDURE — 99214 PR OFFICE/OUTPT VISIT, EST, LEVL IV, 30-39 MIN: ICD-10-PCS | Mod: ,,,

## 2023-04-25 RX ORDER — DOXYCYCLINE HYCLATE 100 MG
100 TABLET ORAL EVERY 12 HOURS
Qty: 20 TABLET | Refills: 0 | Status: SHIPPED | OUTPATIENT
Start: 2023-04-25 | End: 2023-05-05

## 2023-04-25 NOTE — PROGRESS NOTES
"    Patient ID: Sotero Medrano Jr. is a 68 y.o. male.    Chief Complaint: Dysuria (Dysuria, frequency X's 1 week)    68-year-old male presents today for requested visit regarding penile burning. This is a new problem. The current episode started in the past 7 days. The problem occurs intermittently. The problem has been unchanged. The quality of the pain is described as burning. The pain is mild. There has been no fever. He is Sexually active. There is No history of pyelonephritis. Associated symptoms include "dark/discolored" semen with ejaculation, dysuria, and hematuria. Pertinent negatives include no discharge, flank pain, frequency, nausea, urgency, vomiting, constipation or rash. He has tried nothing for the symptoms.  Does have some concerns regarding possible prostate cancer.  Of note, patient is an active smoker and no recent PSA level in system.        MEDICAL HISTORY:    Past Medical History:   Diagnosis Date    Right inguinal hernia       Past Surgical History:   Procedure Laterality Date    TONSILLECTOMY        Social History     Tobacco Use    Smoking status: Every Day     Packs/day: 0.50     Types: Cigarettes    Smokeless tobacco: Never   Substance Use Topics    Alcohol use: Yes     Alcohol/week: 4.0 standard drinks     Types: 4 Cans of beer per week     Comment: daily    Drug use: Yes     Types: Marijuana          Health Maintenance Due   Topic Date Due    Hepatitis C Screening  Never done    Lipid Panel  Never done    COVID-19 Vaccine (1) Never done    Pneumococcal Vaccines (Age 65+) (1 - PCV) Never done    TETANUS VACCINE  Never done    Colorectal Cancer Screening  Never done    Shingles Vaccine (1 of 2) Never done    Abdominal Aortic Aneurysm Screening  Never done    Influenza Vaccine (1) Never done          Patient Care Team:  Sosa Kolb NP as PCP - General (Family Medicine)      Review of Systems   Constitutional:  Negative for fatigue and fever.   HENT:  Negative for congestion, rhinorrhea, " "sore throat and trouble swallowing.    Eyes:  Negative for redness and visual disturbance.   Respiratory:  Negative for cough, chest tightness and shortness of breath.    Cardiovascular:  Negative for chest pain and palpitations.   Gastrointestinal:  Negative for abdominal pain, diarrhea and nausea.   Genitourinary:  Positive for dysuria, hematuria and penile pain. Negative for flank pain, penile discharge, penile swelling and testicular pain.   Musculoskeletal:  Negative for arthralgias, gait problem and myalgias.   Skin:  Negative for wound.   Neurological:  Negative for facial asymmetry, speech difficulty, weakness and headaches.   All other systems reviewed and are negative.    Objective:   BP (!) 152/68   Pulse 67   Temp 97.2 °F (36.2 °C) (Temporal)   Resp 20   Ht 5' 6" (1.676 m)   Wt 64.9 kg (143 lb)   BMI 23.08 kg/m²      Physical Exam  Constitutional:       General: He is not in acute distress.     Appearance: Normal appearance.   HENT:      Right Ear: Tympanic membrane, ear canal and external ear normal.      Left Ear: Tympanic membrane, ear canal and external ear normal.      Nose: Nose normal.      Mouth/Throat:      Mouth: Mucous membranes are moist.      Pharynx: Oropharynx is clear.   Eyes:      Extraocular Movements: Extraocular movements intact.      Conjunctiva/sclera: Conjunctivae normal.      Pupils: Pupils are equal, round, and reactive to light.   Cardiovascular:      Rate and Rhythm: Normal rate and regular rhythm.      Pulses: Normal pulses.      Heart sounds: Normal heart sounds. No murmur heard.    No gallop.   Pulmonary:      Effort: Pulmonary effort is normal.      Breath sounds: Normal breath sounds. No wheezing.   Abdominal:      General: Bowel sounds are normal. There is no distension.      Palpations: Abdomen is soft. There is no mass.      Tenderness: There is no abdominal tenderness. There is no guarding.   Musculoskeletal:         General: Normal range of motion.   Skin:     " General: Skin is warm and dry.   Neurological:      Mental Status: He is alert. Mental status is at baseline.      Sensory: No sensory deficit.      Motor: No weakness.         Assessment:       ICD-10-CM ICD-9-CM   1. Dysuria  R30.0 788.1   2. Concern about STD in male without diagnosis  Z71.1 V65.5   3. Hematuria, unspecified type  R31.9 599.70   4. Screening for malignant neoplasm of prostate  Z12.5 V76.44        Plan:     Problem List Items Addressed This Visit          Renal/    Dysuria - Primary     -order UA with reflex to culture   -initiating on Doxy for possible STI           Relevant Medications    doxycycline (VIBRA-TABS) 100 MG tablet    Other Relevant Orders    Urinalysis, Reflex to Urine Culture    POCT URINE DIPSTICK WITHOUT MICROSCOPE (Completed)    Chlamydia/GC, PCR    CBC Auto Differential    Hematuria     -initiating UA with reflex culture   -order current PSA level  -ordering CBC              Relevant Orders    Urinalysis, Reflex to Urine Culture    POCT URINE DIPSTICK WITHOUT MICROSCOPE (Completed)    Comprehensive Metabolic Panel    CBC Auto Differential    PSA, Screening       ID    Concern about STD in male without diagnosis     -obtain UA with reflex   -order chlamydia add Trichomonas to urine  -dark/discolored semen and hematuria, initiate on doxycycline preemptively           Relevant Medications    doxycycline (VIBRA-TABS) 100 MG tablet    Other Relevant Orders    Urinalysis, Reflex to Urine Culture    Chlamydia/GC, PCR    CBC Auto Differential     Other Visit Diagnoses       Screening for malignant neoplasm of prostate        Relevant Orders    PSA, Screening               Follow up for Present to ER/Urgent Care if symtoms worsen, Previously scheduled and PRN if need.   -plan specifics discussed above    Orders Placed This Encounter    Chlamydia/GC, PCR    Urinalysis, Reflex to Urine Culture    Comprehensive Metabolic Panel    CBC Auto Differential    PSA, Screening    POCT URINE  DIPSTICK WITHOUT MICROSCOPE    doxycycline (VIBRA-TABS) 100 MG tablet        Medication List with Changes/Refills   New Medications    DOXYCYCLINE (VIBRA-TABS) 100 MG TABLET    Take 1 tablet (100 mg total) by mouth every 12 (twelve) hours. for 10 days   Current Medications    HYDROCODONE-ACETAMINOPHEN (NORCO) 5-325 MG PER TABLET    Take 1 tablet by mouth every 6 (six) hours as needed for Pain.    MELOXICAM (MOBIC) 15 MG TABLET    Take 1 tablet (15 mg total) by mouth once daily.    MONTELUKAST (SINGULAIR) 10 MG TABLET    Take 1 tablet (10 mg total) by mouth every evening.

## 2023-04-25 NOTE — ASSESSMENT & PLAN NOTE
-obtain UA with reflex   -order chlamydia add Trichomonas to urine  -dark/discolored semen and hematuria, initiate on doxycycline preemptively

## 2023-05-01 PROBLEM — J01.11 ACUTE RECURRENT FRONTAL SINUSITIS: Status: RESOLVED | Noted: 2023-01-24 | Resolved: 2023-05-01

## 2023-09-12 ENCOUNTER — OFFICE VISIT (OUTPATIENT)
Dept: FAMILY MEDICINE | Facility: CLINIC | Age: 68
End: 2023-09-12
Payer: MEDICARE

## 2023-09-12 VITALS
SYSTOLIC BLOOD PRESSURE: 166 MMHG | HEIGHT: 66 IN | TEMPERATURE: 97 F | DIASTOLIC BLOOD PRESSURE: 76 MMHG | WEIGHT: 149 LBS | RESPIRATION RATE: 20 BRPM | HEART RATE: 61 BPM | BODY MASS INDEX: 23.95 KG/M2

## 2023-09-12 DIAGNOSIS — I10 HYPERTENSION, UNSPECIFIED TYPE: ICD-10-CM

## 2023-09-12 DIAGNOSIS — M25.511 CHRONIC RIGHT SHOULDER PAIN: ICD-10-CM

## 2023-09-12 DIAGNOSIS — M62.838 MUSCLE SPASM: ICD-10-CM

## 2023-09-12 DIAGNOSIS — G89.29 CHRONIC RIGHT SHOULDER PAIN: ICD-10-CM

## 2023-09-12 DIAGNOSIS — T14.8XXA MUSCLE STRAIN: ICD-10-CM

## 2023-09-12 DIAGNOSIS — F17.200 SMOKER: ICD-10-CM

## 2023-09-12 PROCEDURE — 99213 PR OFFICE/OUTPT VISIT, EST, LEVL III, 20-29 MIN: ICD-10-PCS | Mod: ,,, | Performed by: NURSE PRACTITIONER

## 2023-09-12 PROCEDURE — 99213 OFFICE O/P EST LOW 20 MIN: CPT | Mod: ,,, | Performed by: NURSE PRACTITIONER

## 2023-09-12 PROCEDURE — 99406 BEHAV CHNG SMOKING 3-10 MIN: CPT | Mod: ,,, | Performed by: NURSE PRACTITIONER

## 2023-09-12 PROCEDURE — 99406 PR TOBACCO USE CESSATION INTERMEDIATE 3-10 MINUTES: ICD-10-PCS | Mod: ,,, | Performed by: NURSE PRACTITIONER

## 2023-09-12 RX ORDER — CYCLOBENZAPRINE HCL 10 MG
10 TABLET ORAL 3 TIMES DAILY PRN
Qty: 30 TABLET | Refills: 1 | Status: SHIPPED | OUTPATIENT
Start: 2023-09-12 | End: 2023-10-02

## 2023-09-12 RX ORDER — MELOXICAM 15 MG/1
15 TABLET ORAL DAILY
Qty: 30 TABLET | Refills: 0 | Status: SHIPPED | OUTPATIENT
Start: 2023-09-12

## 2023-09-12 RX ORDER — LISINOPRIL 10 MG/1
10 TABLET ORAL DAILY
Qty: 90 TABLET | Refills: 3 | Status: SHIPPED | OUTPATIENT
Start: 2023-09-12 | End: 2023-12-12

## 2023-09-12 NOTE — PROGRESS NOTES
"Subjective:       Patient ID: Sotero Medrano Jr. is a 68 y.o. male.    Chief Complaint: Shoulder Pain (Right Shoulder pain that goes into the side  )      The patient experiencing right shoulder.  The patient tells the pain chronic and he is had it for years.    He states he picked up an oxygen tank a few days ago and is pretty sure he pulled a muscle near his ribcage on the right mid chest.    Shoulder Pain   The pain is present in the neck and right shoulder. This is a chronic problem. The current episode started more than 1 year ago. There has been no history of extremity trauma. The problem occurs intermittently. The problem has been unchanged. The quality of the pain is described as aching and sharp. The pain is at a severity of 9/10. The pain is severe. Associated symptoms include stiffness. The symptoms are aggravated by activity. He has tried NSAIDS for the symptoms. The treatment provided mild relief. Family history includes arthritis.     Review of Systems   Musculoskeletal:  Positive for stiffness.   12 point review of systems conducted, negative except as stated in the history of present illness. See HPI for details.      Objective:      Visit Vitals  BP (!) 166/76   Pulse 61   Temp 97.3 °F (36.3 °C) (Temporal)   Resp 20   Ht 5' 6" (1.676 m)   Wt 67.6 kg (149 lb)   BMI 24.05 kg/m²     Physical Exam  Vitals and nursing note reviewed.   HENT:      Head: Normocephalic.      Right Ear: Tympanic membrane normal.      Left Ear: Tympanic membrane normal.      Nose: Nose normal.      Mouth/Throat:      Mouth: Mucous membranes are moist.   Eyes:      Extraocular Movements: Extraocular movements intact.   Cardiovascular:      Rate and Rhythm: Normal rate and regular rhythm.      Heart sounds: No murmur heard.  Pulmonary:      Effort: Pulmonary effort is normal.   Abdominal:      General: Bowel sounds are normal.      Palpations: Abdomen is soft.   Musculoskeletal:         General: Tenderness and signs of injury " present.      Cervical back: Normal range of motion and neck supple.      Comments: Right mid chest muscle strain    Limited range of motion right shoulder   Skin:     General: Skin is warm and dry.   Neurological:      Mental Status: He is alert and oriented to person, place, and time.       Current Outpatient Medications   Medication Instructions    cyclobenzaprine (FLEXERIL) 10 mg, Oral, 3 times daily PRN    lisinopriL 10 mg, Oral, Daily    meloxicam (MOBIC) 15 mg, Oral, Daily     has No Known Allergies.       Assessment:         ICD-10-CM ICD-9-CM   1. Hypertension, unspecified type  I10 401.9   2. Chronic right shoulder pain  M25.511 719.41    G89.29 338.29   3. Muscle spasm  M62.838 728.85   4. Muscle strain  T14.8XXA 848.9   5. Smoker  F17.200 305.1          Plan:       1. Hypertension, unspecified type  Place patient on blood pressure medication  Patient to keep blood pressure log for 1 week and hand deliver to office  - lisinopriL 10 MG tablet; Take 1 tablet (10 mg total) by mouth once daily.  Dispense: 90 tablet; Refill: 3  - CBC Auto Differential; Future  - Comprehensive Metabolic Panel; Future  - Lipid Panel; Future  - TSH; Future  - Urinalysis; Future    2. Chronic right shoulder pain  Patient declined steroid injection  - meloxicam (MOBIC) 15 MG tablet; Take 1 tablet (15 mg total) by mouth once daily.  Dispense: 30 tablet; Refill: 0  Cyclobenzaprine 10 mg oral t.i.d. p.r.n. muscle spasm  - CBC Auto Differential; Future  - Comprehensive Metabolic Panel; Future  - Lipid Panel; Future  - TSH; Future  - Urinalysis; Future    3. Muscle spasm  - cyclobenzaprine (FLEXERIL) 10 MG tablet; Take 1 tablet (10 mg total) by mouth 3 (three) times daily as needed for Muscle spasms.  Dispense: 30 tablet; Refill: 1  - CBC Auto Differential; Future  - Comprehensive Metabolic Panel; Future  - Lipid Panel; Future  - TSH; Future  - Urinalysis; Future    4. Muscle strain  Take nonsteroidal anti-inflammatory medication as  ordered  Use muscle relaxant as ordered    5. Smoker  Tobacco user  Adverse affects of tobacco discussed; 5 minutes spent counseling  Patient encouraged to abstain from tobacco products  Return to clinic with any concerns        Follow up in about 4 weeks (around 10/10/2023).     Future Appointments   Date Time Provider Department Center   10/2/2023  8:30 AM NURSE, [facility] Goldberg   10/12/2023  8:00 [facility]

## 2023-10-02 ENCOUNTER — CLINICAL SUPPORT (OUTPATIENT)
Dept: FAMILY MEDICINE | Facility: CLINIC | Age: 68
End: 2023-10-02
Payer: MEDICARE

## 2023-10-02 DIAGNOSIS — I10 HYPERTENSION, UNSPECIFIED TYPE: ICD-10-CM

## 2023-10-02 DIAGNOSIS — M25.511 CHRONIC RIGHT SHOULDER PAIN: ICD-10-CM

## 2023-10-02 DIAGNOSIS — G89.29 CHRONIC RIGHT SHOULDER PAIN: ICD-10-CM

## 2023-10-02 DIAGNOSIS — M62.838 MUSCLE SPASM: ICD-10-CM

## 2023-10-02 DIAGNOSIS — F17.200 SMOKER: ICD-10-CM

## 2023-10-02 LAB
ALBUMIN SERPL-MCNC: 3.9 G/DL (ref 3.4–4.8)
ALBUMIN/GLOB SERPL: 1.4 RATIO (ref 1.1–2)
ALP SERPL-CCNC: 71 UNIT/L (ref 40–150)
ALT SERPL-CCNC: 18 UNIT/L (ref 0–55)
APPEARANCE UR: CLEAR
AST SERPL-CCNC: 18 UNIT/L (ref 5–34)
BASOPHILS # BLD AUTO: 0.06 X10(3)/MCL
BASOPHILS NFR BLD AUTO: 0.7 %
BILIRUB SERPL-MCNC: 0.6 MG/DL
BILIRUB UR QL STRIP.AUTO: NEGATIVE
BUN SERPL-MCNC: 16 MG/DL (ref 8.4–25.7)
CALCIUM SERPL-MCNC: 9.6 MG/DL (ref 8.8–10)
CHLORIDE SERPL-SCNC: 107 MMOL/L (ref 98–107)
CHOLEST SERPL-MCNC: 165 MG/DL
CHOLEST/HDLC SERPL: 3 {RATIO} (ref 0–5)
CO2 SERPL-SCNC: 26 MMOL/L (ref 23–31)
COLOR UR AUTO: YELLOW
CREAT SERPL-MCNC: 0.9 MG/DL (ref 0.73–1.18)
EOSINOPHIL # BLD AUTO: 0.69 X10(3)/MCL (ref 0–0.9)
EOSINOPHIL NFR BLD AUTO: 8.1 %
ERYTHROCYTE [DISTWIDTH] IN BLOOD BY AUTOMATED COUNT: 13.2 % (ref 11.5–17)
GFR SERPLBLD CREATININE-BSD FMLA CKD-EPI: >60 MLS/MIN/1.73/M2
GLOBULIN SER-MCNC: 2.8 GM/DL (ref 2.4–3.5)
GLUCOSE SERPL-MCNC: 109 MG/DL (ref 82–115)
GLUCOSE UR QL STRIP.AUTO: NEGATIVE
HCT VFR BLD AUTO: 50.1 % (ref 42–52)
HDLC SERPL-MCNC: 53 MG/DL (ref 35–60)
HGB BLD-MCNC: 16.3 G/DL (ref 14–18)
IMM GRANULOCYTES # BLD AUTO: 0.02 X10(3)/MCL (ref 0–0.04)
IMM GRANULOCYTES NFR BLD AUTO: 0.2 %
KETONES UR QL STRIP.AUTO: NEGATIVE
LDLC SERPL CALC-MCNC: 99 MG/DL (ref 50–140)
LEUKOCYTE ESTERASE UR QL STRIP.AUTO: NEGATIVE
LYMPHOCYTES # BLD AUTO: 2.09 X10(3)/MCL (ref 0.6–4.6)
LYMPHOCYTES NFR BLD AUTO: 24.4 %
MCH RBC QN AUTO: 32.1 PG (ref 27–31)
MCHC RBC AUTO-ENTMCNC: 32.5 G/DL (ref 33–36)
MCV RBC AUTO: 98.8 FL (ref 80–94)
MONOCYTES # BLD AUTO: 0.7 X10(3)/MCL (ref 0.1–1.3)
MONOCYTES NFR BLD AUTO: 8.2 %
NEUTROPHILS # BLD AUTO: 4.99 X10(3)/MCL (ref 2.1–9.2)
NEUTROPHILS NFR BLD AUTO: 58.4 %
NITRITE UR QL STRIP.AUTO: NEGATIVE
NRBC BLD AUTO-RTO: 0 %
PH UR STRIP.AUTO: 6 [PH]
PLATELET # BLD AUTO: 211 X10(3)/MCL (ref 130–400)
PMV BLD AUTO: 10.7 FL (ref 7.4–10.4)
POTASSIUM SERPL-SCNC: 5.4 MMOL/L (ref 3.5–5.1)
PROT SERPL-MCNC: 6.7 GM/DL (ref 5.8–7.6)
PROT UR QL STRIP.AUTO: NEGATIVE
RBC # BLD AUTO: 5.07 X10(6)/MCL (ref 4.7–6.1)
RBC UR QL AUTO: NEGATIVE
SODIUM SERPL-SCNC: 141 MMOL/L (ref 136–145)
SP GR UR STRIP.AUTO: 1.02 (ref 1–1.03)
TRIGL SERPL-MCNC: 64 MG/DL (ref 34–140)
TSH SERPL-ACNC: 1.2 UIU/ML (ref 0.35–4.94)
UROBILINOGEN UR STRIP-ACNC: 0.2
VLDLC SERPL CALC-MCNC: 13 MG/DL
WBC # SPEC AUTO: 8.55 X10(3)/MCL (ref 4.5–11.5)

## 2023-10-02 PROCEDURE — 84443 ASSAY THYROID STIM HORMONE: CPT | Performed by: NURSE PRACTITIONER

## 2023-10-02 PROCEDURE — 36415 COLL VENOUS BLD VENIPUNCTURE: CPT

## 2023-10-02 PROCEDURE — 81003 URINALYSIS AUTO W/O SCOPE: CPT | Performed by: NURSE PRACTITIONER

## 2023-10-02 PROCEDURE — 85025 COMPLETE CBC W/AUTO DIFF WBC: CPT | Performed by: NURSE PRACTITIONER

## 2023-10-02 PROCEDURE — 80053 COMPREHEN METABOLIC PANEL: CPT | Performed by: NURSE PRACTITIONER

## 2023-10-02 PROCEDURE — 80061 LIPID PANEL: CPT | Performed by: NURSE PRACTITIONER

## 2023-10-12 ENCOUNTER — OFFICE VISIT (OUTPATIENT)
Dept: FAMILY MEDICINE | Facility: CLINIC | Age: 68
End: 2023-10-12
Payer: MEDICARE

## 2023-10-12 VITALS
RESPIRATION RATE: 20 BRPM | TEMPERATURE: 98 F | WEIGHT: 151 LBS | HEART RATE: 60 BPM | SYSTOLIC BLOOD PRESSURE: 169 MMHG | HEIGHT: 66 IN | BODY MASS INDEX: 24.27 KG/M2 | DIASTOLIC BLOOD PRESSURE: 82 MMHG

## 2023-10-12 DIAGNOSIS — I10 HYPERTENSION, UNSPECIFIED TYPE: Primary | ICD-10-CM

## 2023-10-12 DIAGNOSIS — Z72.0 TOBACCO USE: ICD-10-CM

## 2023-10-12 PROCEDURE — 99406 BEHAV CHNG SMOKING 3-10 MIN: CPT | Mod: ,,, | Performed by: NURSE PRACTITIONER

## 2023-10-12 PROCEDURE — 99213 PR OFFICE/OUTPT VISIT, EST, LEVL III, 20-29 MIN: ICD-10-PCS | Mod: ,,, | Performed by: NURSE PRACTITIONER

## 2023-10-12 PROCEDURE — 99406 PR TOBACCO USE CESSATION INTERMEDIATE 3-10 MINUTES: ICD-10-PCS | Mod: ,,, | Performed by: NURSE PRACTITIONER

## 2023-10-12 PROCEDURE — 99213 OFFICE O/P EST LOW 20 MIN: CPT | Mod: ,,, | Performed by: NURSE PRACTITIONER

## 2023-10-12 NOTE — PROGRESS NOTES
"Subjective:       Patient ID: Sotero Medrano Jr. is a 68 y.o. male.    Chief Complaint: Hypertension (Check up) and Results (Lab results)    The patient is a 68-year-old male who presents for a checkup.  The patient is on lisinopril 10 mg and tells me takes his blood pressure medication every day.  He states he checks his pressure at home and it is usually 130/70.  Today it is 169/82.  I rechecked it prior to the patient leaving the office and it was 169/80.  Please see our plan.    We went over all the lab work with the patient.  Patient's lab work is within normal limits for age.        Review of Systems 12 point review of systems conducted, negative except as stated in the history of present illness. See HPI for details.        Objective:        Visit Vitals  BP (!) 169/82   Pulse 60   Temp 98.2 °F (36.8 °C) (Oral)   Resp 20   Ht 5' 6" (1.676 m)   Wt 68.5 kg (151 lb)   BMI 24.37 kg/m²        Physical Exam  Vitals and nursing note reviewed.   HENT:      Head: Normocephalic.      Right Ear: Tympanic membrane normal.      Left Ear: Tympanic membrane normal.      Nose: Nose normal.      Mouth/Throat:      Mouth: Mucous membranes are moist.   Eyes:      Extraocular Movements: Extraocular movements intact.   Cardiovascular:      Rate and Rhythm: Normal rate and regular rhythm.      Heart sounds: No murmur heard.  Pulmonary:      Effort: Pulmonary effort is normal.      Breath sounds: Normal breath sounds.   Abdominal:      General: Bowel sounds are normal.      Palpations: Abdomen is soft.   Musculoskeletal:         General: Normal range of motion.      Cervical back: Normal range of motion and neck supple.   Skin:     General: Skin is warm and dry.   Neurological:      Mental Status: He is alert and oriented to person, place, and time.           Assessment:           ICD-10-CM ICD-9-CM   1. Hypertension, unspecified type  I10 401.9   2. Tobacco use  Z72.0 305.1            Plan:         1. Hypertension, unspecified " type  Continue lisinopril 10 mg oral daily  Keep track of blood pressures for 1 week and hand deliver blood pressure log to our office  Re-evaluate blood pressure readings in 1 week  If blood pressure readings are high add HCTZ to patient regimen    2. Tobacco use  Tobacco user  Adverse affects of tobacco discussed; 5 minutes spent counseling  Patient encouraged to abstain from tobacco products  Return to clinic with any concerns            Follow up in about 6 months (around 4/12/2024), or if symptoms worsen or fail to improve, for Blood Pressure Check in 1 week.     Future Appointments   Date Time Provider Department Center   4/15/2024  9:00 AM Sosa Kolb NP [facility] Goldberg        Past Medical History:   Diagnosis Date    Right inguinal hernia         Review of patient's allergies indicates:  No Known Allergies     Current Outpatient Medications   Medication Instructions    lisinopriL 10 mg, Oral, Daily    meloxicam (MOBIC) 15 mg, Oral, Daily          Patient Active Problem List   Diagnosis    Inguinal hernia, right    Nausea    Diarrhea    Viral gastritis    Corneal irritation of left eye    Eye infection, left    Chronic midline low back pain without sciatica    Arthritis    Cough    Recurrent acute serous otitis media of right ear    Throat infection    Smoker    Concern about STD in male without diagnosis    Dysuria    Hematuria    Hypertension    Muscle spasm    Muscle strain    Tobacco use             Past Medical History:   Diagnosis Date    Right inguinal hernia           Past Surgical History:   Procedure Laterality Date    TONSILLECTOMY             reports that he has been smoking cigarettes. He has been exposed to tobacco smoke. He has never used smokeless tobacco. He reports current alcohol use of about 4.0 standard drinks of alcohol per week. He reports current drug use. Drug: Marijuana.      There is no immunization history on file for this patient.     Health Maintenance   Topic Date Due     Hepatitis C Screening  Never done    TETANUS VACCINE  Never done    Colorectal Cancer Screening  Never done    Shingles Vaccine (1 of 2) Never done    Abdominal Aortic Aneurysm Screening  Never done    Lipid Panel  10/02/2028

## 2023-12-12 ENCOUNTER — OFFICE VISIT (OUTPATIENT)
Dept: FAMILY MEDICINE | Facility: CLINIC | Age: 68
End: 2023-12-12
Payer: MEDICARE

## 2023-12-12 VITALS
SYSTOLIC BLOOD PRESSURE: 156 MMHG | DIASTOLIC BLOOD PRESSURE: 78 MMHG | RESPIRATION RATE: 20 BRPM | HEART RATE: 87 BPM | HEIGHT: 66 IN | BODY MASS INDEX: 23.63 KG/M2 | TEMPERATURE: 98 F | WEIGHT: 147 LBS

## 2023-12-12 DIAGNOSIS — R05.9 COUGH, UNSPECIFIED TYPE: ICD-10-CM

## 2023-12-12 DIAGNOSIS — I10 HYPERTENSION, UNSPECIFIED TYPE: ICD-10-CM

## 2023-12-12 DIAGNOSIS — J02.9 SORE THROAT: ICD-10-CM

## 2023-12-12 DIAGNOSIS — R09.81 SINUS CONGESTION: ICD-10-CM

## 2023-12-12 DIAGNOSIS — J32.4 PANSINUSITIS, UNSPECIFIED CHRONICITY: ICD-10-CM

## 2023-12-12 LAB
CTP QC/QA: YES
POC MOLECULAR INFLUENZA A AGN: NEGATIVE
POC MOLECULAR INFLUENZA B AGN: NEGATIVE

## 2023-12-12 PROCEDURE — 87502 INFLUENZA DNA AMP PROBE: CPT | Mod: QW,RHCUB | Performed by: NURSE PRACTITIONER

## 2023-12-12 PROCEDURE — 99214 OFFICE O/P EST MOD 30 MIN: CPT | Mod: ,,, | Performed by: NURSE PRACTITIONER

## 2023-12-12 PROCEDURE — 99214 PR OFFICE/OUTPT VISIT, EST, LEVL IV, 30-39 MIN: ICD-10-PCS | Mod: ,,, | Performed by: NURSE PRACTITIONER

## 2023-12-12 RX ORDER — PROMETHAZINE HYDROCHLORIDE 6.25 MG/5ML
6.25 SYRUP ORAL EVERY 6 HOURS PRN
Qty: 150 ML | Refills: 0 | Status: SHIPPED | OUTPATIENT
Start: 2023-12-12 | End: 2023-12-22

## 2023-12-12 RX ORDER — LISINOPRIL 20 MG/1
20 TABLET ORAL DAILY
Qty: 90 TABLET | Refills: 3 | Status: SHIPPED | OUTPATIENT
Start: 2023-12-12 | End: 2024-12-11

## 2023-12-12 RX ORDER — MONTELUKAST SODIUM 10 MG/1
10 TABLET ORAL NIGHTLY
Qty: 30 TABLET | Refills: 0 | Status: SHIPPED | OUTPATIENT
Start: 2023-12-12 | End: 2024-01-11

## 2023-12-12 RX ORDER — PREDNISONE 10 MG/1
10 TABLET ORAL DAILY
Qty: 5 TABLET | Refills: 0 | Status: SHIPPED | OUTPATIENT
Start: 2023-12-12 | End: 2023-12-17

## 2023-12-12 RX ORDER — AMOXICILLIN 500 MG/1
500 TABLET, FILM COATED ORAL EVERY 12 HOURS
Qty: 14 TABLET | Refills: 0 | Status: SHIPPED | OUTPATIENT
Start: 2023-12-12 | End: 2023-12-19

## 2023-12-12 NOTE — PROGRESS NOTES
"Subjective:       Patient ID: Sotero Medrano Jr. is a 68 y.o. male.    Chief Complaint: Cough (Cough, congestion, sinus pressure, sinus HA, sore throat X's 1 week)      Patient is complaining of sinus headache, sinus pressure, sore throat, cough and body aches.  Patient has been taking over-the-counter decongestants without relief for 1 week    Influenza A and B swab-negative  Patient refuses to be checked for COVID-19      Review of [id number] point review of systems conducted, negative except as stated in the history of present illness. See HPI for details.      Objective:      Visit Vitals  BP (!) 156/78   Pulse 87   Temp 98.4 °F (36.9 °C)   Resp 20   Ht 5' 6" (1.676 m)   Wt 66.7 kg (147 lb)   BMI 23.73 kg/m²     Physical Exam  Vitals and nursing note reviewed.   HENT:      Head: Normocephalic.      Right Ear: Tympanic membrane normal.      Left Ear: Tympanic membrane normal.      Ears:      Comments: [place]     Nose: Congestion and rhinorrhea present.      Comments: Maxillary and frontal sinuses painful to palpation     Mouth/Throat:      Mouth: Mucous membranes are moist.      Pharynx: Oropharyngeal exudate and posterior oropharyngeal erythema present.   Eyes:      Extraocular Movements: Extraocular movements intact.   Cardiovascular:      Rate and Rhythm: Normal rate and regular rhythm.      Heart sounds: No murmur heard.  Pulmonary:      Effort: Pulmonary effort is normal.      Breath sounds: Normal breath sounds.   Abdominal:      General: Bowel sounds are normal.      Palpations: Abdomen is soft.   Musculoskeletal:         General: Normal range of motion.      Cervical back: Normal range of motion and neck supple.   Skin:     General: Skin is warm.   Neurological:      Mental Status: He is alert and oriented to person, place, and time.       Current Outpatient Medications   Medication Instructions    amoxicillin (AMOXIL) 500 mg, Oral, Every 12 hours    lisinopriL (PRINIVIL,ZESTRIL) 20 mg, Oral, Daily    " meloxicam (MOBIC) 15 mg, Oral, Daily    montelukast (SINGULAIR) 10 mg, Oral, Nightly    predniSONE (DELTASONE) 10 mg, Oral, Daily    promethazine (PHENERGAN) 6.25 mg, Oral, Every 6 hours PRN     has No Known Allergies.       Assessment:         ICD-10-CM ICD-9-CM   1. Pansinusitis, unspecified chronicity  J32.4 473.8   2. Hypertension, unspecified type  I10 401.9   3. Cough, unspecified type  R05.9 786.2   4. Sore throat  J02.9 462   5. Sinus congestion  R09.81 478.19          Plan:       1. Pansinusitis, unspecified chronicity  - amoxicillin (AMOXIL) 500 MG Tab; Take 1 tablet (500 mg total) by mouth every 12 (twelve) hours. for 7 days  Dispense: 14 tablet; Refill: 0  - predniSONE (DELTASONE) 10 MG tablet; Take 1 tablet (10 mg total) by mouth once daily. for 5 days  Dispense: 5 tablet; Refill: 0  - Ambulatory referral/consult to ENT; Future    2. Hypertension, unspecified type  Do not take over-the-counter decongestants  We will change dose of lisinopril from 10 mg to 20  - lisinopriL (PRINIVIL,ZESTRIL) 20 MG tablet; Take 1 tablet (20 mg total) by mouth once daily.  Dispense: 90 tablet; Refill: 3    3. Cough, unspecified type  - POCT Influenza A/B Molecular-negative  - promethazine (PHENERGAN) 6.25 mg/5 mL syrup; Take 5 mLs (6.25 mg total) by mouth every 6 (six) hours as needed (cough).  Dispense: 150 mL; Refill: 0    4. Sore throat  Gargle with warm salt water b.i.d.    5. Sinus congestion  - montelukast (SINGULAIR) 10 mg tablet; Take 1 tablet (10 mg total) by mouth every evening.  Dispense: 30 tablet; Refill: 0        Follow up if symptoms worsen or fail to improve.     Future Appointments   Date Time Provider Department Center   4/15/2024  9:00 AM Sosa Kolb NP [facility]EL Goldberg

## 2024-02-27 NOTE — TELEPHONE ENCOUNTER
I called the patient's spoke with him about his x-ray findings.  No acute abnormality multilevel degenerative changes of the lumbar spine.  I will order an MRI patient is in agreement.   Patient Name: Nida Peck  : 1994    MRN: [id number]                              Today's Date: 2024       Admit Date: 2024    Visit Dx:     ICD-10-CM ICD-9-CM   1. Subdural hematoma  S06.5XAA 432.1   2. Seizure-like activity  R56.9 780.39   3. Hypomagnesemia  E83.42 275.2   4. Hypokalemia  E87.6 276.8   5. Elevated liver enzymes  R74.8 790.5   6. Macrocytic anemia  D53.9 281.9   7. Hyperglycemia  R73.9 790.29   8. Thrombocytopenia  D69.6 287.5   9. Laceration of forehead, initial encounter  S01.81XA 873.42   10. Subarachnoid bleed  I60.9 430     Patient Active Problem List   Diagnosis    Subdural hematoma     History reviewed. No pertinent past medical history.  History reviewed. No pertinent surgical history.   General Information       Row Name 24 0937          Physical Therapy Time and Intention    Document Type therapy note (daily note)  -MS (r) MH (t) MS (c)     Mode of Treatment individual therapy;physical therapy  -MS (r) MH (t) MS (c)       Row Name 24 0937          General Information    Patient Profile Reviewed yes  -MS (r) MH (t) MS (c)     Existing Precautions/Restrictions fall  -MS (r) MH (t) MS (c)       Row Name 24 0937          Cognition    Orientation Status (Cognition) oriented x 4;oriented to;person;place;situation;time  -MS (r) MH (t) MS (c)       Row Name 24 0937          Safety Issues, Functional Mobility    Safety Issues Affecting Function (Mobility) insight into deficits/self-awareness;safety precautions follow-through/compliance  -MS (r) MH (t) MS (c)     Comment, Safety Issues/Impairments (Mobility) Gait belt and non skid socks donned.  -MS (r) MH (t) MS (c)               User Key  (r) = Recorded By, (t) = Taken By, (c) = Cosigned By      Initials Name Provider Type    Cecelia Pyle, PT Physical Therapist    Orlando Hendricks PT Student PT Student                   Mobility       Row Name 24 0938          Bed Mobility    Bed  Mobility supine-sit;sit-supine;scooting/bridging  -MS (r) MH (t) MS (c)     Scooting/Bridging [place] (Bed Mobility) supervision;verbal cues;nonverbal cues (demo/gesture)  -MS (r) MH (t) MS (c)     Supine-Sit [place] (Bed Mobility) not tested  -MS (r) MH (t) MS (c)     Sit-Supine [place] (Bed Mobility) verbal cues;nonverbal cues (demo/gesture);supervision  -MS (r) MH (t) MS (c)     Assistive Device (Bed Mobility) bed rails  -MS (r) MH (t) MS (c)     Comment, (Bed Mobility) Increased time needed to complete bed mobility transfers this date.  -MS (r) MH (t) MS (c)       Row Name 02/27/24 0938          Bed-Chair Transfer    Bed-Chair [place] (Transfers) not tested  -MS (r) MH (t) MS (c)       Row Name 02/27/24 0938          Sit-Stand Transfer    Sit-Stand [place] (Transfers) standby assist;verbal cues;nonverbal cues (demo/gesture)  -MS (r) MH (t) MS (c)       Row Name 02/27/24 0938          Gait/Stairs (Locomotion)    [place] Level (Gait) standby assist;verbal cues;nonverbal cues (demo/gesture)  -MS (r) MH (t) MS (c)     Patient was able to Ambulate yes  -MS (r) MH (t) MS (c)     Distance in Feet (Gait) 300 ft  -MS (r) MH (t) MS (c)     Deviations/Abnormal Patterns (Gait) base of support, wide;gait speed decreased;dennis decreased  -MS (r) MH (t) MS (c)     Bilateral Gait Deviations forward flexed posture  -MS (r) MH (t) MS (c)     [place] Level (Stairs) not tested  -MS (r) MH (t) MS (c)     Comment, (Gait/Stairs) Pt needed cueing to increase stride and step amplitude d/t pt's tendency to ambulate with a short shuffled gait pattern. No overt LOB or veering noted this date.  -MS (r) MH (t) MS (c)               User Key  (r) = Recorded By, (t) = Taken By, (c) = Cosigned By      Initials Name Provider Type    Cecelia Pyle, PT Physical Therapist    Orlando Hendricks, MAGDALENO Student PT Student                   Obj/Interventions       Row Name 02/27/24 0942          Motor Skills     Therapeutic Exercise --  2 STS  -MS (r) MH (t) MS (c)       Row Name 02/27/24 0942          Balance    Balance Assessment sitting static balance;sit to stand dynamic balance;standing static balance;standing dynamic balance  -MS (r) MH (t) MS (c)     Static Sitting Balance supervision  -MS (r) MH (t) MS (c)     Dynamic Sitting Balance not tested  -MS (r) MH (t) MS (c)     Position, Sitting Balance sitting edge of bed;unsupported  -MS (r) MH (t) MS (c)     Sit to Stand Dynamic Balance standby assist;verbal cues;non-verbal cues (demo/gesture)  -MS (r) MH (t) MS (c)     Static Standing Balance standby assist;verbal cues;non-verbal cues (demo/gesture)  -MS (r) MH (t) MS (c)     Dynamic Standing Balance non-verbal cues (demo/gesture);verbal cues;standby assist  -MS (r) MH (t) MS (c)     Balance Interventions sitting;standing;static;dynamic;sit to stand  -MS (r) MH (t) MS (c)     Comment, Balance Pt had no overt LOB or veering.  -MS (r) MH (t) MS (c)               User Key  (r) = Recorded By, (t) = Taken By, (c) = Cosigned By      Initials Name Provider Type    Cecelia Pyle, PT Physical Therapist    Orlando Hendricks, PT Student PT Student                   Goals/Plan    No documentation.                  Clinical Impression       Row Name 02/27/24 0914          Pain    Pretreatment Pain Rating 0/10 - no pain  -MS (r) MH (t) MS (c)     Posttreatment Pain Rating 0/10 - no pain  -MS (r) MH (t) MS (c)     Pre/Posttreatment Pain Comment No pain reported this date.  -MS (r) MH (t) MS (c)       Row Name 02/27/24 0923          Plan of Care Review    Plan of Care Reviewed With patient  -MS (r) MH (t) MS (c)     Progress improving  -MS (r) MH (t) MS (c)     Outcome Evaluation Pt was agreeable to PT treatment this treatment session. Pt was A & O x 4, however still had moderate levels of confusion during session. Pt required supervison for sit>supine, scooting towards HOB, and during static sitting balance on EOB. Pt  needed increased time for transfers. Pt required SBA for static/dynamic balance and during ambulation. Pt was able to ambulate around 300 feet with SBA. Pt needed mod VCs to increase step and stride amplitude, since pt presented with a shuffling gait pattern. Pt progressing well, PT will begin following peripherally. PT recommends home vs home with HHPT at this time. Pt was left supine in bed with call light in reach and bed alarm activated.  -MS (r) MH (t) MS (c)       Row Name 02/27/24 0945          Therapy Assessment/Plan (PT)    Rehab Potential (PT) good, to achieve stated therapy goals  -MS (r) MH (t) MS (c)     Criteria for Skilled Interventions Met (PT) yes;meets criteria;skilled treatment is necessary  -MS (r) MH (t) MS (c)     Therapy Frequency (PT) 3 times/wk  -MS (r) MH (t) MS (c)       Row Name 02/27/24 0945          Vital Signs    Pre Systolic BP Rehab 124  -MS (r) MH (t) MS (c)     Pre Treatment Diastolic BP 93  -MS (r) MH (t) MS (c)     Pre SpO2 (%) 98  -MS (r) MH (t) MS (c)     O2 Delivery Pre Treatment room air  -MS (r) MH (t) MS (c)     O2 Delivery Intra Treatment room air  -MS (r) MH (t) MS (c)     O2 Delivery Post Treatment room air  -MS (r) MH (t) MS (c)     Pre Patient Position Standing  -MS (r) MH (t) MS (c)     Intra Patient Position Standing  -MS (r) MH (t) MS (c)     Post Patient Position Supine  -MS (r) MH (t) MS (c)       Row Name 02/27/24 0945          Positioning and Restraints    Pre-Treatment Position standing in room  with nsg using restroom upon arrival  -MS (r) MH (t) MS (c)     Post Treatment Position bed  -MS (r) MH (t) MS (c)     In Bed notified nsg;call light within reach;exit alarm on;encouraged to call for assist  -MS (r) MH (t) MS (c)               User Key  (r) = Recorded By, (t) = Taken By, (c) = Cosigned By      Initials Name Provider Type    Cecelia Pyle, PT Physical Therapist    Orlando Hendricks, PT Student PT Student                   Outcome Measures        Row Name 02/27/24 0953 02/27/24 0800       How much help from another person do you currently need...    Turning from your back to your side while in flat bed without using bedrails? 4  -MS (r) MH (t) MS (c) 4  -AB    Moving from lying on back to sitting on the side of a flat bed without bedrails? 4  -MS (r) MH (t) MS (c) 4  -AB    Moving to and from a bed to a chair (including a wheelchair)? 4  -MS (r) MH (t) MS (c) 4  -AB    Standing up from a chair using your arms (e.g., wheelchair, bedside chair)? 4  -MS (r) MH (t) MS (c) 4  -AB    Climbing 3-5 steps with a railing? 3  -MS (r) MH (t) MS (c) 4  -AB    To walk in hospital room? 4  -MS (r) MH (t) MS (c) 4  -AB    AM-PAC 6 Clicks Score (PT) 23  -MS (r) MH (t) 24  -AB    Highest Level of Mobility Goal 7 --> Walk 25 feet or more  -MS (r) MH (t) 8 --> Walked 250 feet or more  -AB      Row Name 02/27/24 0953          Functional Assessment    Outcome Measure Options AM-PAC 6 Clicks Basic Mobility (PT)  -MS (r) MH (t) MS (c)               User Key  (r) = Recorded By, (t) = Taken By, (c) = Cosigned By      Initials Name Provider Type    MS BarrientosCecelia, PT Physical Therapist    Jeremías Lockwood RN Registered Nurse    Orlando Hendricks, PT Student PT Student                                 Physical Therapy Education       Title: PT OT SLP Therapies (In Progress)       Topic: Physical Therapy (Done)       Point: Mobility training (Done)       Learning Progress Summary             Patient Acceptance, E,TB, VU,NR by  at 2/27/2024 0953    Acceptance, E, VU by AB at 2/26/2024 1751    Acceptance, E,TB, VU by MS at 2/26/2024 1207    Acceptance, E, NR by  at 2/24/2024 1514                         Point: Home exercise program (Done)       Learning Progress Summary             Patient Acceptance, E, VU by AB at 2/26/2024 1751    Acceptance, E,TB, VU by MS at 2/26/2024 1207    Acceptance, E, NR by DJ at 2/23/2024 1030                         Point: Body  mechanics (Done)       Learning Progress Summary             Patient Acceptance, E,TB, VU,NR by  at 2/27/2024 0953    Acceptance, E, VU by AB at 2/26/2024 1751    Acceptance, E,TB, VU by MS at 2/26/2024 1207    Acceptance, E, NR by  at 2/24/2024 1514    Acceptance, E, NR by  at 2/23/2024 1030                         Point: Precautions (Done)       Learning Progress Summary             Patient Acceptance, E,TB, VU,NR by  at 2/27/2024 0953    Acceptance, E, VU by AB at 2/26/2024 1751    Acceptance, E,TB, VU by MS at 2/26/2024 1207    Acceptance, E, NR by  at 2/24/2024 1514    Acceptance, E, NR by  at 2/23/2024 1030                                         User Key       Initials Effective Dates Name Provider Type Discipline    MS 06/16/21 -  Cecelia Barrientos, PT Physical Therapist PT     10/25/19 -  Saida Shen, PT Physical Therapist PT    AB 02/09/23 -  Jeremías Newman, RN Registered Nurse Nurse     05/02/22 -  Lisa Tyson, PT Physical Therapist PT     01/24/24 -  Orlando Ambriz, PT Student PT Student PT                  PT Recommendation and Plan     Plan of Care Reviewed With: patient  Progress: improving  Outcome Evaluation: Pt was agreeable to PT treatment this treatment session. Pt was A & O x 4, however still had moderate levels of confusion during session. Pt required supervison for sit>supine, scooting towards HOB, and during static sitting balance on EOB. Pt needed increased time for transfers. Pt required SBA for static/dynamic balance and during ambulation. Pt was able to ambulate around 300 feet with SBA. Pt needed mod VCs to increase step and stride amplitude, since pt presented with a shuffling gait pattern. Pt progressing well, PT will begin following peripherally. PT recommends home vs home with HHPT at this time. Pt was left supine in bed with call light in reach and bed alarm activated.     Time Calculation:         PT Charges       Row Name 02/27/24 1118 02/27/24 0990           Time Calculation    Start Time -- 0824  -MS (r) MH (t) MS (c)     Stop Time -- 0839  -MS (r) MH (t) MS (c)     Time Calculation (min) -- 15 min  -MS (r) MH (t)     PT Received On -- 02/27/24  -MS (r) MH (t) MS (c)     PT - Next Appointment 02/29/24  -MS 02/28/24  -MS (r) MH (t) MS (c)        Time Calculation- PT    Total Timed Code Minutes- PT -- 15 minute(s)  -MS (r) MH (t) MS (c)        Timed Charges    43445 - PT Therapeutic Activity Minutes -- 15  -MS (r) MH (t) MS (c)        Total Minutes    Timed Charges Total Minutes -- 15  -MS (r) MH (t)      Total Minutes -- 15  -MS (r) MH (t)               User Key  (r) = Recorded By, (t) = Taken By, (c) = Cosigned By      Initials Name Provider Type    Cecelia Pyle LAYTON, PT Physical Therapist    Orlando Hendricks, PT Student PT Student                  Therapy Charges for Today       Code Description Service Date Service Provider Modifiers Qty    [id number] HC PT THERAPEUTIC ACT EA 15 MIN 2/27/2024 Orlando Ambriz, PT Student GP 1            PT G-Codes  Outcome Measure Options: AM-PAC 6 Clicks Basic Mobility (PT)  AM-PAC 6 Clicks Score (PT): 23  AM-PAC 6 Clicks Score (OT): 17  Modified Dianne Scale: 4 - Moderately severe disability.  Unable to walk without assistance, and unable to attend to own bodily needs without assistance.  PT Discharge Summary  Anticipated Discharge Disposition (PT): home with assist, home with home health, skilled nursing facility    Orlando Ambriz PT Student  2/27/2024

## 2024-02-29 ENCOUNTER — OFFICE VISIT (OUTPATIENT)
Dept: FAMILY MEDICINE | Facility: CLINIC | Age: 69
End: 2024-02-29
Payer: MEDICARE

## 2024-02-29 VITALS
TEMPERATURE: 98 F | HEIGHT: 66 IN | SYSTOLIC BLOOD PRESSURE: 173 MMHG | BODY MASS INDEX: 23.63 KG/M2 | RESPIRATION RATE: 20 BRPM | DIASTOLIC BLOOD PRESSURE: 76 MMHG | WEIGHT: 147 LBS | HEART RATE: 52 BPM

## 2024-02-29 DIAGNOSIS — Z72.0 TOBACCO USE: ICD-10-CM

## 2024-02-29 DIAGNOSIS — R39.12 WEAK URINARY STREAM: ICD-10-CM

## 2024-02-29 DIAGNOSIS — R09.81 SINUS CONGESTION: ICD-10-CM

## 2024-02-29 DIAGNOSIS — R35.0 URINARY FREQUENCY: ICD-10-CM

## 2024-02-29 DIAGNOSIS — I10 HYPERTENSION, UNSPECIFIED TYPE: ICD-10-CM

## 2024-02-29 DIAGNOSIS — Z12.5 SCREENING FOR MALIGNANT NEOPLASM OF PROSTATE: ICD-10-CM

## 2024-02-29 PROCEDURE — 99214 OFFICE O/P EST MOD 30 MIN: CPT | Mod: ,,, | Performed by: NURSE PRACTITIONER

## 2024-02-29 RX ORDER — AMLODIPINE BESYLATE 5 MG/1
5 TABLET ORAL DAILY
Qty: 30 TABLET | Refills: 11 | Status: SHIPPED | OUTPATIENT
Start: 2024-02-29 | End: 2024-03-22

## 2024-02-29 RX ORDER — MONTELUKAST SODIUM 10 MG/1
10 TABLET ORAL NIGHTLY
Qty: 30 TABLET | Refills: 6 | Status: SHIPPED | OUTPATIENT
Start: 2024-02-29 | End: 2024-09-26

## 2024-02-29 NOTE — PROGRESS NOTES
"Subjective:       Patient ID: Sotero Medrano Jr. is a 68 y.o. male.    Chief Complaint: Follow-up (6 month follow up high blood pressure )    The patient is 68-year-old male who presents with elevated blood pressure.  The patient has been on lisinopril 20 mg states his blood pressure remains pretty high at home.  The patient has not call me to let know that his blood pressure was elevated.  Today the patient's blood pressure is 187/75.  When the patient less office his blood pressure was 173/76.  We are adjusting his medications.  The patient will keep blood pressure log for 1 week and hand deliver to our office.  Patient continues to smoke cigarettes and states he drinks 2 or 3 beers every night.        Review of Systems 12 point review of systems conducted, negative except as stated in the history of present illness. See HPI for details.        Objective:        Visit Vitals  BP (!) 173/76   Pulse (!) 52   Temp 97.8 °F (36.6 °C) (Temporal)   Resp 20   Ht 5' 6" (1.676 m)   Wt 66.7 kg (147 lb)   BMI 23.73 kg/m²        Physical Exam  Vitals and nursing note reviewed.   HENT:      Right Ear: Tympanic membrane normal.      Left Ear: Tympanic membrane normal.      Nose: Nose normal.      Mouth/Throat:      Mouth: Mucous membranes are moist.   Eyes:      Extraocular Movements: Extraocular movements intact.   Cardiovascular:      Rate and Rhythm: Normal rate and regular rhythm.      Heart sounds: No murmur heard.  Pulmonary:      Effort: Pulmonary effort is normal.      Breath sounds: Normal breath sounds.   Abdominal:      General: Bowel sounds are normal.      Palpations: Abdomen is soft.   Musculoskeletal:         General: Normal range of motion.      Cervical back: Normal range of motion and neck supple.   Skin:     General: Skin is dry.   Neurological:      Mental Status: He is alert and oriented to person, place, and time.           Assessment:           ICD-10-CM ICD-9-CM   1. Hypertension, unspecified type  I10 " 401.9   2. Tobacco use  Z72.0 305.1   3. Sinus congestion  R09.81 478.19   4. Weak urinary stream  R39.12 788.62   5. Urinary frequency  R35.0 788.41   6. Screening for malignant neoplasm of prostate  Z12.5 V76.44            Plan:         1. Hypertension, unspecified type  The patient blood pressure continues to be elevated  We have adjusted his medication by adding amlodipine 5 mg oral daily  The patient will hand deliver a one-week blood pressure log to our office  I will continue to adjust his medication as necessary to control    2. Tobacco use  Tobacco user  Adverse affects of tobacco discussed; 5 minutes spent counseling  Patient encouraged to abstain from tobacco products  Return to clinic with any concerns    3. Sinus congestion  - montelukast (SINGULAIR) 10 mg tablet; Take 1 tablet (10 mg total) by mouth every evening.  Dispense: 30 tablet; Refill: 6    4. Weak urinary stream  - PSA, Screening; Future    5. Urinary frequency  - PSA, Screening; Future    6. Screening for malignant neoplasm of prostate  - PSA, Screening; Future          Follow up in about 1 week (around 3/7/2024), or if symptoms worsen or fail to improve.     Future Appointments   Date Time Provider Department Center   3/5/2024  8:15 AM NURSE, [facility] FAMILY [facility]IC[facility] MARIVEL Goldberg        Past Medical History:   Diagnosis Date    Right inguinal hernia         Review of patient's allergies indicates:  No Known Allergies     Current Outpatient Medications   Medication Instructions    amLODIPine (NORVASC) 5 mg, Oral, Daily    lisinopriL (PRINIVIL,ZESTRIL) 20 mg, Oral, Daily    meloxicam (MOBIC) 15 mg, Oral, Daily    montelukast (SINGULAIR) 10 mg, Oral, Nightly          Patient Active Problem List   Diagnosis    Inguinal hernia, right    Nausea    Diarrhea    Viral gastritis    Corneal irritation of left eye    Eye infection, left    Chronic midline low back pain without sciatica    Arthritis    Cough    Recurrent acute serous otitis media of  right ear    Sore throat    Smoker    Concern about STD in male without diagnosis    Dysuria    Hematuria    Hypertension    Muscle spasm    Muscle strain    Tobacco use    Sinus congestion    Pansinusitis    Screening for malignant neoplasm of prostate    Urinary frequency    Weak urinary stream             Past Medical History:   Diagnosis Date    Right inguinal hernia           Past Surgical History:   Procedure Laterality Date    TONSILLECTOMY             reports that he has been smoking cigarettes. He has been exposed to tobacco smoke. He has never used smokeless tobacco. He reports current alcohol use of about 4.0 standard drinks of alcohol per week. He reports current drug use. Drug: Marijuana.      There is no immunization history on file for this patient.     Health Maintenance   Topic Date Due    Hepatitis C Screening  Never done    TETANUS VACCINE  Never done    Colorectal Cancer Screening  Never done    Shingles Vaccine (1 of 2) Never done    Abdominal Aortic Aneurysm Screening  Never done    Lipid Panel  10/02/2028

## 2024-03-05 ENCOUNTER — CLINICAL SUPPORT (OUTPATIENT)
Dept: FAMILY MEDICINE | Facility: CLINIC | Age: 69
End: 2024-03-05
Payer: MEDICARE

## 2024-03-05 DIAGNOSIS — R39.12 WEAK URINARY STREAM: ICD-10-CM

## 2024-03-05 DIAGNOSIS — Z12.5 SCREENING FOR MALIGNANT NEOPLASM OF PROSTATE: ICD-10-CM

## 2024-03-05 DIAGNOSIS — R35.0 URINARY FREQUENCY: ICD-10-CM

## 2024-03-05 LAB — PSA SERPL-MCNC: 1.94 NG/ML

## 2024-03-05 PROCEDURE — 84153 ASSAY OF PSA TOTAL: CPT | Performed by: NURSE PRACTITIONER

## 2024-03-05 PROCEDURE — 36415 COLL VENOUS BLD VENIPUNCTURE: CPT

## 2024-03-08 ENCOUNTER — TELEPHONE (OUTPATIENT)
Dept: FAMILY MEDICINE | Facility: CLINIC | Age: 69
End: 2024-03-08
Payer: MEDICARE

## 2024-03-08 NOTE — TELEPHONE ENCOUNTER
I called the patient and got his voicemail.  I left him a message letting know his PSA is normal at 1.94

## 2024-03-22 DIAGNOSIS — I10 HYPERTENSION, UNSPECIFIED TYPE: Primary | ICD-10-CM

## 2024-03-22 RX ORDER — AMLODIPINE BESYLATE 10 MG/1
10 TABLET ORAL DAILY
Qty: 30 TABLET | Refills: 11 | Status: SHIPPED | OUTPATIENT
Start: 2024-03-22 | End: 2025-03-22

## 2024-03-22 NOTE — PROGRESS NOTES
I called the patient and spoke with him about increasing his blood pressure medicine.  We are going to DC the amlodipine 5 mg in order amlodipine 10 mg oral daily.  The patient will pick it up at [facility].  He will also keep track of his blood pressures for a week while on the new medication dose and hand deliver the blood pressure log to our office

## 2024-04-11 DIAGNOSIS — M25.511 CHRONIC RIGHT SHOULDER PAIN: ICD-10-CM

## 2024-04-11 DIAGNOSIS — G89.29 CHRONIC RIGHT SHOULDER PAIN: ICD-10-CM

## 2024-04-11 RX ORDER — MELOXICAM 15 MG/1
15 TABLET ORAL
Qty: 30 TABLET | Refills: 5 | Status: SHIPPED | OUTPATIENT
Start: 2024-04-11

## 2024-09-03 ENCOUNTER — OFFICE VISIT (OUTPATIENT)
Dept: FAMILY MEDICINE | Facility: CLINIC | Age: 69
End: 2024-09-03
Payer: MEDICARE

## 2024-09-03 VITALS
DIASTOLIC BLOOD PRESSURE: 61 MMHG | HEIGHT: 66 IN | HEART RATE: 53 BPM | WEIGHT: 148 LBS | SYSTOLIC BLOOD PRESSURE: 127 MMHG | TEMPERATURE: 98 F | RESPIRATION RATE: 20 BRPM | BODY MASS INDEX: 23.78 KG/M2

## 2024-09-03 DIAGNOSIS — J32.9 SINUSITIS, UNSPECIFIED CHRONICITY, UNSPECIFIED LOCATION: Primary | ICD-10-CM

## 2024-09-03 PROCEDURE — 99214 OFFICE O/P EST MOD 30 MIN: CPT | Mod: ,,, | Performed by: NURSE PRACTITIONER

## 2024-09-03 RX ORDER — AMOXICILLIN 500 MG/1
500 TABLET, FILM COATED ORAL EVERY 12 HOURS
Qty: 14 TABLET | Refills: 0 | Status: SHIPPED | OUTPATIENT
Start: 2024-09-03 | End: 2024-09-10

## 2024-09-03 NOTE — PROGRESS NOTES
"Subjective:       Patient ID: Sotero Medrano Jr. is a 69 y.o. male.    Chief Complaint: Sinus Problem (X7 days patient states that the left side is congested, patient states that he has pain under his eye, patient states that it goes to his ear as well)      The patient is a 69-year-old male who presents complaining of left-sided sinus pain, cheek pain, tooth pain, and ear pain.      Review of [id number] point review of systems conducted, negative except as stated in the history of present illness. See HPI for details.      Objective:      Visit Vitals  /61   Pulse (!) 53   Temp 97.5 °F (36.4 °C) (Temporal)   Resp 20   Ht 5' 6" (1.676 m)   Wt 67.1 kg (148 lb)   BMI 23.89 kg/m²     Physical Exam  Vitals and nursing note reviewed.   HENT:      Head: Normocephalic.      Right Ear: Tympanic membrane normal.      Ears:      Comments: Left TM bulging with effusion     Nose: Congestion and rhinorrhea present.      Comments: Left maxillary sinuses tender to palpation     Mouth/Throat:      Pharynx: Oropharyngeal exudate and posterior oropharyngeal erythema present.   Eyes:      Extraocular Movements: Extraocular movements intact.   Cardiovascular:      Rate and Rhythm: Normal rate and regular rhythm.      Heart sounds: No murmur heard.  Pulmonary:      Effort: Pulmonary effort is normal.      Breath sounds: Normal breath sounds.   Abdominal:      General: Bowel sounds are normal.      Palpations: Abdomen is soft.   Musculoskeletal:         General: Normal range of motion.      Cervical back: Normal range of motion and neck supple.   Skin:     General: Skin is warm and dry.   Neurological:      Mental Status: He is alert and oriented to person, place, and time.       Current Outpatient Medications   Medication Instructions    amLODIPine (NORVASC) 10 mg, Oral, Daily    amoxicillin (AMOXIL) 500 mg, Oral, Every 12 hours    lisinopriL (PRINIVIL,ZESTRIL) 20 mg, Oral, Daily    meloxicam (MOBIC) 15 mg, Oral    montelukast " (SINGULAIR) 10 mg, Oral, Nightly     has No Known Allergies.       Assessment:         ICD-10-CM ICD-9-CM   1. Sinusitis, unspecified chronicity, unspecified location  J32.9 473.9          Plan:       1. Sinusitis, unspecified chronicity, unspecified location  Drink 4-6 8 oz glasses water daily to liquify secretions  Continue to take cetirizine 10 mg oral daily  Continue to take montelukast as prescribed  - amoxicillin (AMOXIL) 500 MG Tab; Take 1 tablet (500 mg total) by mouth every 12 (twelve) hours. for 7 days  Dispense: 14 tablet; Refill: 0        Follow up if symptoms worsen or fail to improve.     Future Appointments   Date Time Provider Department Center   9/25/2024  9:00 AM Sosa Kolb NP [facility] MARIVEL Goldberg

## 2024-10-24 DIAGNOSIS — R09.81 SINUS CONGESTION: ICD-10-CM

## 2024-10-24 RX ORDER — MONTELUKAST SODIUM 10 MG/1
10 TABLET ORAL NIGHTLY
Qty: 30 TABLET | Refills: 6 | Status: SHIPPED | OUTPATIENT
Start: 2024-10-24

## 2024-11-01 ENCOUNTER — OFFICE VISIT (OUTPATIENT)
Dept: FAMILY MEDICINE | Facility: CLINIC | Age: 69
End: 2024-11-01
Payer: MEDICARE

## 2024-11-01 VITALS
HEART RATE: 59 BPM | WEIGHT: 146 LBS | TEMPERATURE: 98 F | BODY MASS INDEX: 23.46 KG/M2 | RESPIRATION RATE: 20 BRPM | HEIGHT: 66 IN | SYSTOLIC BLOOD PRESSURE: 137 MMHG | DIASTOLIC BLOOD PRESSURE: 55 MMHG

## 2024-11-01 DIAGNOSIS — G62.9 NEUROPATHY: ICD-10-CM

## 2024-11-01 DIAGNOSIS — J30.2 SEASONAL ALLERGIES: ICD-10-CM

## 2024-11-01 DIAGNOSIS — F17.200 SMOKER: ICD-10-CM

## 2024-11-01 DIAGNOSIS — I10 HYPERTENSION, UNSPECIFIED TYPE: ICD-10-CM

## 2024-11-01 DIAGNOSIS — R09.81 SINUS CONGESTION: ICD-10-CM

## 2024-11-01 PROBLEM — M54.50 CHRONIC MIDLINE LOW BACK PAIN WITHOUT SCIATICA: Status: RESOLVED | Noted: 2023-01-11 | Resolved: 2024-11-01

## 2024-11-01 PROBLEM — J32.9 SINUSITIS: Status: RESOLVED | Noted: 2023-12-12 | Resolved: 2024-11-01

## 2024-11-01 PROBLEM — R11.0 NAUSEA: Status: RESOLVED | Noted: 2022-07-18 | Resolved: 2024-11-01

## 2024-11-01 PROBLEM — H65.04 RECURRENT ACUTE SEROUS OTITIS MEDIA OF RIGHT EAR: Status: RESOLVED | Noted: 2023-01-24 | Resolved: 2024-11-01

## 2024-11-01 PROBLEM — K29.70 VIRAL GASTRITIS: Status: RESOLVED | Noted: 2022-07-18 | Resolved: 2024-11-01

## 2024-11-01 PROBLEM — R31.9 HEMATURIA: Status: RESOLVED | Noted: 2023-04-25 | Resolved: 2024-11-01

## 2024-11-01 PROBLEM — T14.8XXA MUSCLE STRAIN: Status: RESOLVED | Noted: 2023-09-12 | Resolved: 2024-11-01

## 2024-11-01 PROBLEM — R19.7 DIARRHEA: Status: RESOLVED | Noted: 2022-07-18 | Resolved: 2024-11-01

## 2024-11-01 PROBLEM — R30.0 DYSURIA: Status: RESOLVED | Noted: 2023-04-25 | Resolved: 2024-11-01

## 2024-11-01 PROBLEM — R35.0 URINARY FREQUENCY: Status: RESOLVED | Noted: 2024-02-29 | Resolved: 2024-11-01

## 2024-11-01 PROBLEM — M62.838 MUSCLE SPASM: Status: RESOLVED | Noted: 2023-09-12 | Resolved: 2024-11-01

## 2024-11-01 PROBLEM — Z72.0 TOBACCO USE: Status: RESOLVED | Noted: 2023-10-12 | Resolved: 2024-11-01

## 2024-11-01 PROBLEM — K40.90 INGUINAL HERNIA, RIGHT: Status: RESOLVED | Noted: 2022-06-03 | Resolved: 2024-11-01

## 2024-11-01 PROBLEM — J02.9 SORE THROAT: Status: RESOLVED | Noted: 2023-01-24 | Resolved: 2024-11-01

## 2024-11-01 PROBLEM — R39.12 WEAK URINARY STREAM: Status: RESOLVED | Noted: 2024-02-29 | Resolved: 2024-11-01

## 2024-11-01 PROBLEM — G89.29 CHRONIC MIDLINE LOW BACK PAIN WITHOUT SCIATICA: Status: RESOLVED | Noted: 2023-01-11 | Resolved: 2024-11-01

## 2024-11-01 RX ORDER — GABAPENTIN 300 MG/1
300 CAPSULE ORAL NIGHTLY
Qty: 30 CAPSULE | Refills: 11 | Status: SHIPPED | OUTPATIENT
Start: 2024-11-01 | End: 2025-11-01

## 2024-11-05 ENCOUNTER — CLINICAL SUPPORT (OUTPATIENT)
Dept: FAMILY MEDICINE | Facility: CLINIC | Age: 69
End: 2024-11-05
Payer: MEDICARE

## 2024-11-05 DIAGNOSIS — J30.2 SEASONAL ALLERGIES: ICD-10-CM

## 2024-11-05 DIAGNOSIS — G62.9 NEUROPATHY: ICD-10-CM

## 2024-11-05 DIAGNOSIS — I10 HYPERTENSION, UNSPECIFIED TYPE: ICD-10-CM

## 2024-11-05 DIAGNOSIS — F17.200 SMOKER: ICD-10-CM

## 2024-11-05 DIAGNOSIS — R09.81 SINUS CONGESTION: ICD-10-CM

## 2024-11-05 LAB
ALBUMIN SERPL-MCNC: 3.8 G/DL (ref 3.4–4.8)
ALBUMIN/GLOB SERPL: 1.4 RATIO (ref 1.1–2)
ALP SERPL-CCNC: 79 UNIT/L (ref 40–150)
ALT SERPL-CCNC: 16 UNIT/L (ref 0–55)
ANION GAP SERPL CALC-SCNC: 8 MEQ/L
AST SERPL-CCNC: 15 UNIT/L (ref 5–34)
BASOPHILS # BLD AUTO: 0.05 X10(3)/MCL
BASOPHILS NFR BLD AUTO: 0.6 %
BILIRUB SERPL-MCNC: 0.5 MG/DL
BILIRUB UR QL STRIP.AUTO: NEGATIVE
BUN SERPL-MCNC: 10 MG/DL (ref 8.4–25.7)
CALCIUM SERPL-MCNC: 9.9 MG/DL (ref 8.8–10)
CHLORIDE SERPL-SCNC: 108 MMOL/L (ref 98–107)
CHOLEST SERPL-MCNC: 180 MG/DL
CHOLEST/HDLC SERPL: 4 {RATIO} (ref 0–5)
CLARITY UR: CLEAR
CO2 SERPL-SCNC: 28 MMOL/L (ref 23–31)
COLOR UR AUTO: YELLOW
CREAT SERPL-MCNC: 0.91 MG/DL (ref 0.72–1.25)
CREAT/UREA NIT SERPL: 11
EOSINOPHIL # BLD AUTO: 0.56 X10(3)/MCL (ref 0–0.9)
EOSINOPHIL NFR BLD AUTO: 6.8 %
ERYTHROCYTE [DISTWIDTH] IN BLOOD BY AUTOMATED COUNT: 13.2 % (ref 11.5–17)
GFR SERPLBLD CREATININE-BSD FMLA CKD-EPI: >60 ML/MIN/1.73/M2
GLOBULIN SER-MCNC: 2.8 GM/DL (ref 2.4–3.5)
GLUCOSE SERPL-MCNC: 96 MG/DL (ref 82–115)
GLUCOSE UR QL STRIP: NEGATIVE
HCT VFR BLD AUTO: 45.7 % (ref 42–52)
HDLC SERPL-MCNC: 47 MG/DL (ref 35–60)
HGB BLD-MCNC: 15.1 G/DL (ref 14–18)
HGB UR QL STRIP: NEGATIVE
IMM GRANULOCYTES # BLD AUTO: 0.03 X10(3)/MCL (ref 0–0.04)
IMM GRANULOCYTES NFR BLD AUTO: 0.4 %
KETONES UR QL STRIP: NEGATIVE
LDLC SERPL CALC-MCNC: 119 MG/DL (ref 50–140)
LEUKOCYTE ESTERASE UR QL STRIP: NEGATIVE
LYMPHOCYTES # BLD AUTO: 2.08 X10(3)/MCL (ref 0.6–4.6)
LYMPHOCYTES NFR BLD AUTO: 25.1 %
MCH RBC QN AUTO: 31.5 PG (ref 27–31)
MCHC RBC AUTO-ENTMCNC: 33 G/DL (ref 33–36)
MCV RBC AUTO: 95.4 FL (ref 80–94)
MONOCYTES # BLD AUTO: 0.67 X10(3)/MCL (ref 0.1–1.3)
MONOCYTES NFR BLD AUTO: 8.1 %
NEUTROPHILS # BLD AUTO: 4.89 X10(3)/MCL (ref 2.1–9.2)
NEUTROPHILS NFR BLD AUTO: 59 %
NITRITE UR QL STRIP: NEGATIVE
NRBC BLD AUTO-RTO: 0 %
PH UR STRIP: 7.5 [PH]
PLATELET # BLD AUTO: 224 X10(3)/MCL (ref 130–400)
PMV BLD AUTO: 10.4 FL (ref 7.4–10.4)
POTASSIUM SERPL-SCNC: 5.4 MMOL/L (ref 3.5–5.1)
PROT SERPL-MCNC: 6.6 GM/DL (ref 5.8–7.6)
PROT UR QL STRIP: NEGATIVE
RBC # BLD AUTO: 4.79 X10(6)/MCL (ref 4.7–6.1)
SODIUM SERPL-SCNC: 144 MMOL/L (ref 136–145)
SP GR UR STRIP.AUTO: 1.01 (ref 1–1.03)
TRIGL SERPL-MCNC: 71 MG/DL (ref 34–140)
TSH SERPL-ACNC: 1.56 UIU/ML (ref 0.35–4.94)
UROBILINOGEN UR STRIP-ACNC: 0.2
VLDLC SERPL CALC-MCNC: 14 MG/DL
WBC # BLD AUTO: 8.28 X10(3)/MCL (ref 4.5–11.5)

## 2024-11-05 PROCEDURE — 80053 COMPREHEN METABOLIC PANEL: CPT | Performed by: NURSE PRACTITIONER

## 2024-11-05 PROCEDURE — 80061 LIPID PANEL: CPT | Performed by: NURSE PRACTITIONER

## 2024-11-05 PROCEDURE — 85025 COMPLETE CBC W/AUTO DIFF WBC: CPT | Performed by: NURSE PRACTITIONER

## 2024-11-05 PROCEDURE — 36415 COLL VENOUS BLD VENIPUNCTURE: CPT

## 2024-11-05 PROCEDURE — 81003 URINALYSIS AUTO W/O SCOPE: CPT | Performed by: NURSE PRACTITIONER

## 2024-11-05 PROCEDURE — 84443 ASSAY THYROID STIM HORMONE: CPT | Performed by: NURSE PRACTITIONER

## 2024-11-14 ENCOUNTER — TELEPHONE (OUTPATIENT)
Dept: FAMILY MEDICINE | Facility: CLINIC | Age: 69
End: 2024-11-14
Payer: MEDICARE

## 2024-11-14 NOTE — TELEPHONE ENCOUNTER
I called the patient and left him a voicemail about his lab work.  The only thing that is really office his potassium level is 5.4.  Instructed the patient to be sure he is drinking at least 4-6 8 oz glasses water daily and to cut out foods that are high in potassium such as zucchini, cucumbers, broccoli, orange juice, bananas, spinach and green leafy vegetables.  Instructed the patient to call the office if he had any questions or concerns

## 2024-11-26 ENCOUNTER — OFFICE VISIT (OUTPATIENT)
Dept: FAMILY MEDICINE | Facility: CLINIC | Age: 69
End: 2024-11-26
Payer: MEDICARE

## 2024-11-26 VITALS
TEMPERATURE: 98 F | WEIGHT: 150 LBS | DIASTOLIC BLOOD PRESSURE: 56 MMHG | RESPIRATION RATE: 20 BRPM | HEIGHT: 66 IN | BODY MASS INDEX: 24.11 KG/M2 | SYSTOLIC BLOOD PRESSURE: 128 MMHG | HEART RATE: 62 BPM

## 2024-11-26 DIAGNOSIS — M54.2 NECK PAIN: Primary | ICD-10-CM

## 2024-11-26 PROBLEM — H18.892 CORNEAL IRRITATION OF LEFT EYE: Status: RESOLVED | Noted: 2022-07-20 | Resolved: 2024-11-26

## 2024-11-26 PROBLEM — R05.9 COUGH: Status: RESOLVED | Noted: 2023-01-24 | Resolved: 2024-11-26

## 2024-11-26 PROBLEM — R09.81 SINUS CONGESTION: Status: RESOLVED | Noted: 2023-12-12 | Resolved: 2024-11-26

## 2024-11-26 PROBLEM — Z71.1 CONCERN ABOUT STD IN MALE WITHOUT DIAGNOSIS: Status: RESOLVED | Noted: 2023-04-25 | Resolved: 2024-11-26

## 2024-11-26 PROBLEM — H44.002 EYE INFECTION, LEFT: Status: RESOLVED | Noted: 2022-07-20 | Resolved: 2024-11-26

## 2024-11-26 PROCEDURE — 99214 OFFICE O/P EST MOD 30 MIN: CPT | Mod: ,,, | Performed by: NURSE PRACTITIONER

## 2024-11-26 NOTE — PROGRESS NOTES
"Subjective:       Patient ID: Sotero Medrano Jr. is a 69 y.o. male.    Chief Complaint: neck pain for years      The patient is a 69-year-old male who presents for neck pain.  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  Several years ago (around 2001) he noticed he had neck pain and x-ray showed arthritis.  He would like to have another x-ray and possibly MRI.    Complaining of 7/10 pain to occipital portion neck on the right especially.  Also complaining of numbness to fingertips.  I gave the patient gabapentin but he was unable to tolerate one 300 mg cap at night.    Neck Pain   This is a chronic problem. The current episode started more than 1 year ago. The problem occurs constantly. The problem has been waxing and waning. The pain is associated with a twisting injury and lifting a heavy object. The pain is present in the occipital region. The quality of the pain is described as aching. The pain is at a severity of 8/10. The pain is severe. The symptoms are aggravated by stress and twisting. The pain is Worse during the night. Stiffness is present In the morning. Associated symptoms include numbness and tingling. Associated symptoms comments: Hands numb. He has tried NSAIDs for the symptoms. The treatment provided moderate relief.     Review of Systems   Musculoskeletal:  Positive for neck pain.   Neurological:  Positive for tingling and numbness.   12 point review of systems conducted, negative except as stated in the history of present illness. See HPI for details.      Objective:      Visit Vitals  BP (!) 128/56   Pulse 62   Temp 97.5 °F (36.4 °C)   Resp 20   Ht 5' 6" (1.676 m)   Wt 68 kg (150 lb)   BMI 24.21 kg/m²     Physical Exam  Vitals and nursing note reviewed.   HENT:      Head: Normocephalic.      Right Ear: Tympanic membrane normal.      Left Ear: Tympanic membrane normal.      Nose: Nose normal.      Mouth/Throat:      Mouth: Mucous membranes are moist.   Eyes:     "  Extraocular Movements: Extraocular movements intact.   Neck:      Comments: Limited range of motion to neck especially when turning neck to the right due to pain  Cardiovascular:      Rate and Rhythm: Normal rate and regular rhythm.      Heart sounds: No murmur heard.  Pulmonary:      Effort: Pulmonary effort is normal.      Breath sounds: Normal breath sounds.   Abdominal:      General: Bowel sounds are normal.      Palpations: Abdomen is soft.   Musculoskeletal:      Cervical back: Neck supple.   Skin:     General: Skin is warm and dry.   Neurological:      Mental Status: He is alert and oriented to person, place, and time.       Current Outpatient Medications   Medication Instructions    amLODIPine (NORVASC) 10 mg, Oral, Daily    lisinopriL (PRINIVIL,ZESTRIL) 20 mg, Oral, Daily    meloxicam (MOBIC) 15 mg, Oral    montelukast (SINGULAIR) 10 mg, Oral, Nightly     has No Known Allergies.       Assessment:         ICD-10-CM ICD-9-CM   1. Neck pain  M54.2 723.1          Plan:       1. Neck pain (Primary)  Patient to perform range-of-motion exercises  May take ibuprofen over-the-counter as needed for pain  - X-Ray Cervical Spine AP And Lateral; Future  May perform MRI of x-ray negative due to progressive neurological deficit          No follow-ups on file.     Future Appointments   Date Time Provider Department Center   11/26/2024 11:00 AM Sosa Kolb NP [facility]   5/15/2025  8:00 AM Sosa Kolb NP [facility]

## 2024-11-26 NOTE — PROGRESS NOTES
"Subjective:       Patient ID: Sotero Medrano Jr. is a 69 y.o. male.    Chief Complaint: Numbness (Follow up hand numbness)    HPI    Review of Systems 12 point review of systems conducted, negative except as stated in the history of present illness. See HPI for details.        Objective:        Visit Vitals  BP (!) 128/56   Pulse 62   Temp 97.5 °F (36.4 °C)   Resp 20   Ht 5' 6" (1.676 m)   Wt 68 kg (150 lb)   BMI 24.21 kg/m²        Physical Exam      Assessment:         No diagnosis found.         Plan:         There are no diagnoses linked to this encounter.          No follow-ups on file.     Future Appointments   Date Time Provider Department Center   11/26/2024 11:00 AM Sosa Kolb NP [facility]   5/15/2025  8:00 AM Sosa Kolb NP [facility]        Past Medical History:   Diagnosis Date    Right inguinal hernia         Review of patient's allergies indicates:  No Known Allergies     Current Outpatient Medications   Medication Instructions    amLODIPine (NORVASC) 10 mg, Oral, Daily    gabapentin (NEURONTIN) 300 mg, Oral, Nightly    lisinopriL (PRINIVIL,ZESTRIL) 20 mg, Oral, Daily    meloxicam (MOBIC) 15 mg, Oral    montelukast (SINGULAIR) 10 mg, Oral, Nightly          Patient Active Problem List   Diagnosis    Corneal irritation of left eye    Eye infection, left    Arthritis    Cough    Concern about STD in male without diagnosis    Hypertension    Sinus congestion    Screening for malignant neoplasm of prostate             Past Medical History:   Diagnosis Date    Right inguinal hernia           Past Surgical History:   Procedure Laterality Date    TONSILLECTOMY             reports that he has been smoking cigarettes. He has been exposed to tobacco smoke. He has never used smokeless tobacco. He reports current alcohol use of about 4.0 standard drinks of alcohol per week. He reports current drug use. Drug: Marijuana.      There is no immunization history on file for this " patient.     Health Maintenance   Topic Date Due    Hepatitis C Screening  Never done    TETANUS VACCINE  Never done    Colorectal Cancer Screening  Never done    Shingles Vaccine (1 of 2) Never done    Abdominal Aortic Aneurysm Screening  Never done    Lipid Panel  11/05/2029

## 2024-11-27 ENCOUNTER — HOSPITAL ENCOUNTER (OUTPATIENT)
Dept: RADIOLOGY | Facility: HOSPITAL | Age: 69
Discharge: HOME | End: 2024-11-27
Attending: NURSE PRACTITIONER
Payer: MEDICARE

## 2024-11-27 DIAGNOSIS — M54.2 NECK PAIN: ICD-10-CM

## 2024-11-27 PROCEDURE — 72040 X-RAY EXAM NECK SPINE 2-3 VW: CPT | Mod: TC

## 2024-12-09 ENCOUNTER — OFFICE VISIT (OUTPATIENT)
Dept: FAMILY MEDICINE | Facility: CLINIC | Age: 69
End: 2024-12-09
Payer: MEDICARE

## 2024-12-09 VITALS
BODY MASS INDEX: 24.1 KG/M2 | TEMPERATURE: 98 F | SYSTOLIC BLOOD PRESSURE: 131 MMHG | WEIGHT: 149.94 LBS | HEART RATE: 69 BPM | RESPIRATION RATE: 20 BRPM | DIASTOLIC BLOOD PRESSURE: 62 MMHG | HEIGHT: 66 IN

## 2024-12-09 DIAGNOSIS — S61.411S LACERATION OF RIGHT HAND WITHOUT FOREIGN BODY, SEQUELA: ICD-10-CM

## 2024-12-09 DIAGNOSIS — M54.2 NECK PAIN: ICD-10-CM

## 2024-12-09 PROBLEM — S61.411A LACERATION OF RIGHT HAND WITHOUT FOREIGN BODY: Status: ACTIVE | Noted: 2024-12-09

## 2024-12-09 PROCEDURE — 99214 OFFICE O/P EST MOD 30 MIN: CPT | Mod: ,,, | Performed by: NURSE PRACTITIONER

## 2024-12-09 NOTE — PROGRESS NOTES
"Subjective:       Patient ID: Sotero Medrano Jr. is a 69 y.o. male.    Chief Complaint: Neck Pain (2 week follow up), Results (X-Ray results), and Hand Injury (Cut hand 2 weeks ago, but not healing properly)    The patient is a 69-year-old male who presents for a follow-up of neck pain.  The patient states the pain is not changed but he is performing exercises hoping to strengthen the muscles in his neck.  We went over the x-ray results which showed degenerative changes and intervertebral narrowing at cervical 3 and 4.    The patient is also concerned about his right hand cut.  He states it is not healing like it should.  However it is only slightly red and nontender to palpation.        Review of Systems 12 point review of systems conducted, negative except as stated in the history of present illness. See HPI for details.        Objective:        Visit Vitals  /62   Pulse 69   Temp 98.2 °F (36.8 °C)   Resp 20   Ht 5' 6" (1.676 m)   Wt 68 kg (149 lb 14.6 oz)   BMI 24.20 kg/m²        Physical Exam  Vitals and nursing note reviewed.   HENT:      Head: Normocephalic.      Right Ear: Tympanic membrane normal.      Left Ear: Tympanic membrane normal.      Nose: Nose normal.      Mouth/Throat:      Mouth: Mucous membranes are moist.   Eyes:      Extraocular Movements: Extraocular movements intact.   Cardiovascular:      Rate and Rhythm: Normal rate and regular rhythm.      Heart sounds: No murmur heard.  Pulmonary:      Effort: Pulmonary effort is normal.      Breath sounds: Normal breath sounds.   Abdominal:      General: Bowel sounds are normal.      Palpations: Abdomen is soft.   Musculoskeletal:         General: Normal range of motion.      Cervical back: Normal range of motion and neck supple.   Skin:     General: Skin is warm and dry.   Neurological:      Mental Status: He is alert and oriented to person, place, and time.           Assessment:           ICD-10-CM ICD-9-CM   1. Neck pain  M54.2 723.1   2. " Laceration of right hand without foreign body, sequela  S61.411S 906.1            Plan:         1. Neck pain  Continue take Bessie cam as prescribed  - MRI Cervical Spine Without Contrast; Future    2. Laceration of right hand without foreign body, sequela  Healing as expected no intervention required          No follow-ups on file.     Future Appointments   Date Time Provider Department Center   5/15/2025  8:00 AM Sosa oKlb NP [facility]        Past Medical History:   Diagnosis Date    Right inguinal hernia         Review of patient's allergies indicates:  No Known Allergies     Current Outpatient Medications   Medication Instructions    amLODIPine (NORVASC) 10 mg, Oral, Daily    lisinopriL (PRINIVIL,ZESTRIL) 20 mg, Oral, Daily    meloxicam (MOBIC) 15 mg, Oral    montelukast (SINGULAIR) 10 mg, Oral, Nightly          Patient Active Problem List   Diagnosis    Arthritis    Hypertension    Screening for malignant neoplasm of prostate    Neck pain    Laceration of right hand without foreign body             Past Medical History:   Diagnosis Date    Right inguinal hernia           Past Surgical History:   Procedure Laterality Date    TONSILLECTOMY             reports that he has been smoking cigarettes. He has been exposed to tobacco smoke. He has never used smokeless tobacco. He reports current alcohol use of about 4.0 standard drinks of alcohol per week. He reports current drug use. Drug: Marijuana.      There is no immunization history on file for this patient.     Health Maintenance   Topic Date Due    Hepatitis C Screening  Never done    Pneumococcal Vaccines (Age 65+) (1 of 2 - PCV) Never done    TETANUS VACCINE  Never done    Colorectal Cancer Screening  Never done    Shingles Vaccine (1 of 2) Never done    RSV Vaccine (Age 60+ and Pregnant patients) (1 - Risk 60-74 years 1-dose series) Never done    Abdominal Aortic Aneurysm Screening  Never done    COVID-19 Vaccine (1 - 2024-25 season) Never  done    Lipid Panel  11/05/2029    Influenza Vaccine  Addressed

## 2025-04-02 DIAGNOSIS — I10 HYPERTENSION, UNSPECIFIED TYPE: ICD-10-CM

## 2025-04-02 RX ORDER — AMLODIPINE BESYLATE 10 MG/1
10 TABLET ORAL DAILY
Qty: 30 TABLET | Refills: 2 | Status: SHIPPED | OUTPATIENT
Start: 2025-04-02 | End: 2025-07-01

## 2025-07-24 ENCOUNTER — PATIENT MESSAGE (OUTPATIENT)
Facility: CLINIC | Age: 70
End: 2025-07-24
Payer: MEDICARE

## 2025-07-24 DIAGNOSIS — I10 HYPERTENSION, UNSPECIFIED TYPE: ICD-10-CM

## 2025-07-24 RX ORDER — AMLODIPINE BESYLATE 10 MG/1
10 TABLET ORAL
Qty: 30 TABLET | Refills: 1 | Status: SHIPPED | OUTPATIENT
Start: 2025-07-24

## 2025-08-15 ENCOUNTER — OFFICE VISIT (OUTPATIENT)
Dept: FAMILY MEDICINE | Facility: CLINIC | Age: 70
End: 2025-08-15
Payer: MEDICARE

## 2025-08-15 VITALS
HEIGHT: 66 IN | TEMPERATURE: 98 F | BODY MASS INDEX: 23.63 KG/M2 | WEIGHT: 147 LBS | SYSTOLIC BLOOD PRESSURE: 135 MMHG | RESPIRATION RATE: 20 BRPM | HEART RATE: 70 BPM | DIASTOLIC BLOOD PRESSURE: 73 MMHG

## 2025-08-15 DIAGNOSIS — I10 HYPERTENSION, UNSPECIFIED TYPE: ICD-10-CM

## 2025-08-15 DIAGNOSIS — Z23 NEED FOR PNEUMOCOCCAL VACCINATION: ICD-10-CM

## 2025-08-15 DIAGNOSIS — Z23 NEED FOR SHINGLES VACCINE: ICD-10-CM

## 2025-08-15 DIAGNOSIS — Z12.11 COLON CANCER SCREENING: ICD-10-CM

## 2025-08-15 DIAGNOSIS — Z11.59 NEED FOR HEPATITIS C SCREENING TEST: ICD-10-CM

## 2025-08-15 DIAGNOSIS — M19.90 ARTHRITIS: ICD-10-CM

## 2025-08-15 DIAGNOSIS — D22.9 ATYPICAL MOLE: ICD-10-CM

## 2025-08-15 DIAGNOSIS — Z00.00 ENCOUNTER FOR PREVENTIVE HEALTH EXAMINATION: ICD-10-CM

## 2025-08-15 DIAGNOSIS — F17.200 TOBACCO DEPENDENCE: ICD-10-CM

## 2025-08-15 DIAGNOSIS — F19.90 ILLICIT DRUG USE: ICD-10-CM

## 2025-08-15 DIAGNOSIS — N52.9 ERECTILE DYSFUNCTION, UNSPECIFIED ERECTILE DYSFUNCTION TYPE: ICD-10-CM

## 2025-08-15 DIAGNOSIS — Z13.6 SCREENING FOR AAA (ABDOMINAL AORTIC ANEURYSM): ICD-10-CM

## 2025-08-15 PROBLEM — S61.411A LACERATION OF RIGHT HAND WITHOUT FOREIGN BODY: Status: RESOLVED | Noted: 2024-12-09 | Resolved: 2025-08-15

## 2025-08-15 LAB
ALBUMIN SERPL-MCNC: 4 G/DL (ref 3.4–4.8)
ALBUMIN/GLOB SERPL: 1.1 RATIO (ref 1.1–2)
ALP SERPL-CCNC: 86 UNIT/L (ref 40–150)
ALT SERPL-CCNC: 16 UNIT/L (ref 0–55)
ANION GAP SERPL CALC-SCNC: 8 MEQ/L
AST SERPL-CCNC: 17 UNIT/L (ref 11–45)
BACTERIA #/AREA URNS AUTO: ABNORMAL /HPF
BASOPHILS # BLD AUTO: 0.05 X10(3)/MCL
BASOPHILS NFR BLD AUTO: 0.5 %
BILIRUB SERPL-MCNC: 0.9 MG/DL
BILIRUB UR QL STRIP.AUTO: ABNORMAL
BUN SERPL-MCNC: 10 MG/DL (ref 8.4–25.7)
CALCIUM SERPL-MCNC: 9.9 MG/DL (ref 8.8–10)
CAOX CRY UR QL COMP ASSIST: ABNORMAL
CHLORIDE SERPL-SCNC: 107 MMOL/L (ref 98–107)
CHOLEST SERPL-MCNC: 179 MG/DL
CHOLEST/HDLC SERPL: 3 {RATIO} (ref 0–5)
CLARITY UR: CLEAR
CO2 SERPL-SCNC: 27 MMOL/L (ref 23–31)
COLOR UR AUTO: YELLOW
CREAT SERPL-MCNC: 0.9 MG/DL (ref 0.72–1.25)
CREAT/UREA NIT SERPL: 11
EOSINOPHIL # BLD AUTO: 0.78 X10(3)/MCL (ref 0–0.9)
EOSINOPHIL NFR BLD AUTO: 7.9 %
ERYTHROCYTE [DISTWIDTH] IN BLOOD BY AUTOMATED COUNT: 13.5 % (ref 11.5–17)
GFR SERPLBLD CREATININE-BSD FMLA CKD-EPI: >60 ML/MIN/1.73/M2
GLOBULIN SER-MCNC: 3.5 GM/DL (ref 2.4–3.5)
GLUCOSE SERPL-MCNC: 92 MG/DL (ref 82–115)
GLUCOSE UR QL STRIP: NEGATIVE
HCT VFR BLD AUTO: 49.2 % (ref 42–52)
HDLC SERPL-MCNC: 61 MG/DL (ref 35–60)
HGB BLD-MCNC: 16.3 G/DL (ref 14–18)
HGB UR QL STRIP: NEGATIVE
IMM GRANULOCYTES # BLD AUTO: 0.04 X10(3)/MCL (ref 0–0.04)
IMM GRANULOCYTES NFR BLD AUTO: 0.4 %
KETONES UR QL STRIP: NEGATIVE
LDLC SERPL CALC-MCNC: 104 MG/DL (ref 50–140)
LEUKOCYTE ESTERASE UR QL STRIP: NEGATIVE
LYMPHOCYTES # BLD AUTO: 2.2 X10(3)/MCL (ref 0.6–4.6)
LYMPHOCYTES NFR BLD AUTO: 22.2 %
MCH RBC QN AUTO: 31.8 PG (ref 27–31)
MCHC RBC AUTO-ENTMCNC: 33.1 G/DL (ref 33–36)
MCV RBC AUTO: 96.1 FL (ref 80–94)
MONOCYTES # BLD AUTO: 1.01 X10(3)/MCL (ref 0.1–1.3)
MONOCYTES NFR BLD AUTO: 10.2 %
MUCOUS THREADS URNS QL MICRO: ABNORMAL /LPF
NEUTROPHILS # BLD AUTO: 5.81 X10(3)/MCL (ref 2.1–9.2)
NEUTROPHILS NFR BLD AUTO: 58.8 %
NITRITE UR QL STRIP: NEGATIVE
NRBC BLD AUTO-RTO: 0 %
PH UR STRIP: 6 [PH]
PLATELET # BLD AUTO: 254 X10(3)/MCL (ref 130–400)
PMV BLD AUTO: 10.3 FL (ref 7.4–10.4)
POTASSIUM SERPL-SCNC: 5.5 MMOL/L (ref 3.5–5.1)
PROT SERPL-MCNC: 7.5 GM/DL (ref 5.8–7.6)
PROT UR QL STRIP: ABNORMAL
RBC # BLD AUTO: 5.12 X10(6)/MCL (ref 4.7–6.1)
RBC #/AREA URNS AUTO: ABNORMAL /HPF
SODIUM SERPL-SCNC: 142 MMOL/L (ref 136–145)
SP GR UR STRIP.AUTO: 1.02 (ref 1–1.03)
SQUAMOUS #/AREA URNS AUTO: ABNORMAL /HPF
TRIGL SERPL-MCNC: 70 MG/DL (ref 34–140)
TSH SERPL-ACNC: 2.39 UIU/ML (ref 0.35–4.94)
UROBILINOGEN UR STRIP-ACNC: 0.2
VLDLC SERPL CALC-MCNC: 14 MG/DL
WBC # BLD AUTO: 9.89 X10(3)/MCL (ref 4.5–11.5)
WBC #/AREA URNS AUTO: ABNORMAL /HPF

## 2025-08-15 PROCEDURE — 80053 COMPREHEN METABOLIC PANEL: CPT | Performed by: NURSE PRACTITIONER

## 2025-08-15 PROCEDURE — 86803 HEPATITIS C AB TEST: CPT | Performed by: NURSE PRACTITIONER

## 2025-08-15 PROCEDURE — 81003 URINALYSIS AUTO W/O SCOPE: CPT | Performed by: NURSE PRACTITIONER

## 2025-08-15 PROCEDURE — 84443 ASSAY THYROID STIM HORMONE: CPT | Performed by: NURSE PRACTITIONER

## 2025-08-15 PROCEDURE — 80061 LIPID PANEL: CPT | Performed by: NURSE PRACTITIONER

## 2025-08-15 PROCEDURE — 85025 COMPLETE CBC W/AUTO DIFF WBC: CPT | Performed by: NURSE PRACTITIONER

## 2025-08-15 RX ORDER — SILDENAFIL 100 MG/1
100 TABLET, FILM COATED ORAL DAILY PRN
Qty: 30 TABLET | Refills: 11 | Status: SHIPPED | OUTPATIENT
Start: 2025-08-15 | End: 2026-08-15

## 2025-08-15 RX ORDER — MULTIVITAMIN WITH IRON
1 TABLET ORAL DAILY
COMMUNITY

## 2025-08-15 RX ORDER — CETIRIZINE HYDROCHLORIDE 10 MG/1
10 TABLET ORAL DAILY
COMMUNITY

## 2025-08-16 LAB — HCV AB SERPL QL IA: NONREACTIVE

## 2025-08-18 PROBLEM — Z00.00 ENCOUNTER FOR PREVENTIVE HEALTH EXAMINATION: Status: RESOLVED | Noted: 2024-02-29 | Resolved: 2025-08-18

## 2025-08-18 PROBLEM — Z11.59 NEED FOR HEPATITIS C SCREENING TEST: Status: RESOLVED | Noted: 2025-08-15 | Resolved: 2025-08-18

## 2025-08-18 PROBLEM — Z23 NEED FOR PNEUMOCOCCAL VACCINATION: Status: RESOLVED | Noted: 2025-08-15 | Resolved: 2025-08-18

## 2025-08-20 ENCOUNTER — PATIENT MESSAGE (OUTPATIENT)
Dept: FAMILY MEDICINE | Facility: CLINIC | Age: 70
End: 2025-08-20
Payer: MEDICARE

## 2025-08-26 ENCOUNTER — HOSPITAL ENCOUNTER (OUTPATIENT)
Dept: RADIOLOGY | Facility: HOSPITAL | Age: 70
Discharge: HOME | End: 2025-08-26
Attending: NURSE PRACTITIONER
Payer: MEDICARE

## 2025-08-26 DIAGNOSIS — I10 HYPERTENSION, UNSPECIFIED TYPE: ICD-10-CM

## 2025-08-26 DIAGNOSIS — Z13.6 SCREENING FOR AAA (ABDOMINAL AORTIC ANEURYSM): ICD-10-CM

## 2025-08-26 PROCEDURE — 76775 US EXAM ABDO BACK WALL LIM: CPT | Mod: TC

## 2025-08-26 RX ORDER — AMLODIPINE BESYLATE 10 MG/1
10 TABLET ORAL
Qty: 30 TABLET | Refills: 5 | Status: SHIPPED | OUTPATIENT
Start: 2025-08-26

## (undated) DEVICE — SUT PROLENE 3-0 30SH

## (undated) DEVICE — GAUZE SPONGE 4X4 12 PLY NS

## (undated) DEVICE — NDL GUARD

## (undated) DEVICE — Device

## (undated) DEVICE — ADHESIVE MASTISOL VIAL 48/BX

## (undated) DEVICE — DRAIN PENROSE SIL .25X18IN

## (undated) DEVICE — MEDIPORE+PAD

## (undated) DEVICE — CHLORAPREP TNT2%SOL10.5ML TEAL

## (undated) DEVICE — TUBE SUCTION YANKAUER

## (undated) DEVICE — GLOVE PROTEXIS PF LATEX 7.0

## (undated) DEVICE — DRESSING MEDIPORE CLTH 3.5X6IN

## (undated) DEVICE — COVER LIGHT HANDLE FLEX GRN

## (undated) DEVICE — SPONGE LAP STRL 18X18IN

## (undated) DEVICE — GLOVE PROTEXIS HYDROGEL SZ8

## (undated) DEVICE — CLIPPER BLADE MOD 4406 (CAREF)

## (undated) DEVICE — SUT 3-0 VICRYL / SH (J416)

## (undated) DEVICE — CLOSURE SKIN STERI STRIP 1/2X4

## (undated) DEVICE — APPLICATOR CHLORAPREP ORN 26ML

## (undated) DEVICE — TUBE SUCTION MEDI-VAC STERILE

## (undated) DEVICE — NDL HYPODERMIC SAF 25G 1.5IN

## (undated) DEVICE — SOL IRRI STRL WATER 1000ML

## (undated) DEVICE — SEE MEDLINE ITEM 146313

## (undated) DEVICE — SEE MEDLINE ITEM 157117

## (undated) DEVICE — ELECTRODE REM PLYHSV RETURN 9

## (undated) DEVICE — SUT MONOCRYL 4-0 PS-2

## (undated) DEVICE — GLOVE PROTEXIS BLUE LATEX 6.5

## (undated) DEVICE — DRAIN PENROSE XRAY 18 X 1/4 ST

## (undated) DEVICE — BLADE 4IN EDGE INSULATED

## (undated) DEVICE — GLOVE PROTEXIS LTX 6.5

## (undated) DEVICE — GOWN SMART IMP BREATHABLE XXLG